# Patient Record
Sex: FEMALE | Race: WHITE | Employment: PART TIME | URBAN - METROPOLITAN AREA
[De-identification: names, ages, dates, MRNs, and addresses within clinical notes are randomized per-mention and may not be internally consistent; named-entity substitution may affect disease eponyms.]

---

## 2017-06-29 ENCOUNTER — OFFICE VISIT (OUTPATIENT)
Dept: INTERNAL MEDICINE CLINIC | Age: 57
End: 2017-06-29
Payer: COMMERCIAL

## 2017-06-29 VITALS
HEIGHT: 62 IN | RESPIRATION RATE: 20 BRPM | SYSTOLIC BLOOD PRESSURE: 90 MMHG | DIASTOLIC BLOOD PRESSURE: 62 MMHG | TEMPERATURE: 98.1 F | HEART RATE: 96 BPM | WEIGHT: 139 LBS | BODY MASS INDEX: 25.58 KG/M2

## 2017-06-29 DIAGNOSIS — Z91.09 ENVIRONMENTAL ALLERGIES: ICD-10-CM

## 2017-06-29 DIAGNOSIS — J01.90 SUBACUTE SINUSITIS, UNSPECIFIED LOCATION: Primary | ICD-10-CM

## 2017-06-29 PROCEDURE — 1036F TOBACCO NON-USER: CPT | Performed by: INTERNAL MEDICINE

## 2017-06-29 PROCEDURE — G8419 CALC BMI OUT NRM PARAM NOF/U: HCPCS | Performed by: INTERNAL MEDICINE

## 2017-06-29 PROCEDURE — 99213 OFFICE O/P EST LOW 20 MIN: CPT | Performed by: INTERNAL MEDICINE

## 2017-06-29 PROCEDURE — 3017F COLORECTAL CA SCREEN DOC REV: CPT | Performed by: INTERNAL MEDICINE

## 2017-06-29 PROCEDURE — 3014F SCREEN MAMMO DOC REV: CPT | Performed by: INTERNAL MEDICINE

## 2017-06-29 PROCEDURE — G8427 DOCREV CUR MEDS BY ELIG CLIN: HCPCS | Performed by: INTERNAL MEDICINE

## 2017-06-29 RX ORDER — AZITHROMYCIN 250 MG/1
TABLET, FILM COATED ORAL
Qty: 1 PACKET | Refills: 0 | Status: SHIPPED | OUTPATIENT
Start: 2017-06-29 | End: 2017-07-10 | Stop reason: CLARIF

## 2017-06-29 RX ORDER — FLUTICASONE PROPIONATE 50 MCG
SPRAY, SUSPENSION (ML) NASAL
Qty: 3 BOTTLE | Refills: 1 | Status: SHIPPED | OUTPATIENT
Start: 2017-06-29 | End: 2018-01-11 | Stop reason: SDUPTHER

## 2017-06-29 ASSESSMENT — PATIENT HEALTH QUESTIONNAIRE - PHQ9
SUM OF ALL RESPONSES TO PHQ QUESTIONS 1-9: 0
1. LITTLE INTEREST OR PLEASURE IN DOING THINGS: 0
2. FEELING DOWN, DEPRESSED OR HOPELESS: 0
SUM OF ALL RESPONSES TO PHQ9 QUESTIONS 1 & 2: 0

## 2017-07-10 RX ORDER — AZITHROMYCIN 250 MG/1
TABLET, FILM COATED ORAL
Qty: 1 PACKET | Refills: 0 | Status: SHIPPED | OUTPATIENT
Start: 2017-07-10 | End: 2017-07-20

## 2017-07-12 ENCOUNTER — TELEPHONE (OUTPATIENT)
Dept: INTERNAL MEDICINE CLINIC | Age: 57
End: 2017-07-12

## 2017-07-12 RX ORDER — CEFUROXIME AXETIL 500 MG/1
500 TABLET ORAL 2 TIMES DAILY
Qty: 20 TABLET | Refills: 0 | Status: SHIPPED | OUTPATIENT
Start: 2017-07-12 | End: 2017-07-22

## 2017-07-20 ENCOUNTER — TELEPHONE (OUTPATIENT)
Dept: INTERNAL MEDICINE CLINIC | Age: 57
End: 2017-07-20

## 2017-07-20 RX ORDER — CEFUROXIME AXETIL 500 MG/1
500 TABLET ORAL 2 TIMES DAILY
Qty: 10 TABLET | Refills: 0 | Status: SHIPPED | OUTPATIENT
Start: 2017-07-20 | End: 2017-07-25

## 2017-07-20 RX ORDER — AMOXICILLIN 500 MG/1
500 TABLET, FILM COATED ORAL 3 TIMES DAILY
Qty: 30 TABLET | Refills: 0 | Status: SHIPPED | OUTPATIENT
Start: 2017-07-20 | End: 2017-07-20 | Stop reason: ALTCHOICE

## 2018-01-11 ENCOUNTER — OFFICE VISIT (OUTPATIENT)
Dept: INTERNAL MEDICINE CLINIC | Age: 58
End: 2018-01-11
Payer: COMMERCIAL

## 2018-01-11 VITALS
OXYGEN SATURATION: 98 % | BODY MASS INDEX: 26.68 KG/M2 | WEIGHT: 145 LBS | SYSTOLIC BLOOD PRESSURE: 102 MMHG | HEIGHT: 62 IN | HEART RATE: 67 BPM | TEMPERATURE: 98.4 F | RESPIRATION RATE: 14 BRPM | DIASTOLIC BLOOD PRESSURE: 64 MMHG

## 2018-01-11 DIAGNOSIS — Z00.00 WELLNESS EXAMINATION: Primary | ICD-10-CM

## 2018-01-11 PROCEDURE — 99396 PREV VISIT EST AGE 40-64: CPT | Performed by: INTERNAL MEDICINE

## 2018-01-11 RX ORDER — FLUTICASONE PROPIONATE 50 MCG
SPRAY, SUSPENSION (ML) NASAL
Qty: 3 BOTTLE | Refills: 1 | Status: SHIPPED | OUTPATIENT
Start: 2018-01-11 | End: 2019-01-31 | Stop reason: SDUPTHER

## 2018-01-11 NOTE — PROGRESS NOTES
Bao Smoker is a 65 y.o. female who presents for   Chief Complaint   Patient presents with    6 Month Follow-Up     Pt has no new concerns today    Other     Pt would like to talk to you about \"Juice Plus\"     and follow up of chronic medical problems. Patient Active Problem List   Diagnosis    MVP (mitral valve prolapse)    Lupus    Irregular menses     HPI  Here for follow-up and for wellness examination and denies any new complaints    Current Outpatient Prescriptions   Medication Sig Dispense Refill    fluticasone (FLONASE) 50 MCG/ACT nasal spray USE 2 SPRAYS IN EACH NOSTRIL DAILY 3 Bottle 1    Loratadine-Pseudoephedrine (CLARITIN-D 12 HOUR PO) Take by mouth daily      desoximetasone (TOPICORT) 0.05 % cream Apply topically 2 times daily. 1 Tube 0    multivitamin (THERAGRAN) per tablet Take 1 tablet by mouth daily.  diphenhydrAMINE (BENADRYL) 25 MG tablet Take 25 mg by mouth nightly.  FISH OIL Take  by mouth daily.  ibuprofen (ADVIL;MOTRIN) 600 MG tablet Take 600 mg by mouth nightly. No current facility-administered medications for this visit.         No Known Allergies    Past Medical History:   Diagnosis Date    Irregular menses     Lupus (HCC)     MVP (mitral valve prolapse)     Seasonal allergies        Past Surgical History:   Procedure Laterality Date    COLONOSCOPY      TUBAL LIGATION         Family History   Problem Relation Age of Onset    High Blood Pressure Father     Heart Disease Other     Cancer Other     Stroke Other     Other Neg Hx      blood clots     ROS   Constitutional:  Negative for fatigue, loss of appetite and unexpected weight change   HEENT            : Negative for neck stiffness and pain, no congestion or sinus pressure   Eyes                : No visual disturbance or pain   Cardiovascular: No chest pain or palpitations or leg swelling   Respiratory      : Negative for cough, shortness of breath or wheezing   Gastrointestinal: Negative No results found for: [address]  Lab Results   Component Value Date    TRIG 56 04/09/2015     No components found for: CHOLHDL  No results found for: WBC, HGB, HCT, MCV, PLT  No results found for: INR, PROTIME  Lab Results   Component Value Date    GLUCOSE 88 04/09/2015    CREATININE 0.69 04/09/2015    BUN 8 04/09/2015     04/09/2015    K 4.2 04/09/2015     04/09/2015    CO2 27 04/09/2015     Lab Results   Component Value Date    ALT 18 04/09/2015    AST 21 04/09/2015    ALKPHOS 80 04/09/2015    BILITOT 0.68 04/09/2015     Lab Results   Component Value Date    LABPROT 7.1 03/21/2013    LABALBU 3.8 04/09/2015     No results found for: TSH, CBC  Assessment:  1. Wellness examination        Plan:  Labs ordered to check for cholesterol and previous labs reviewed  Discussed about immunizations and information given to the patient  Discussed about juice plus and patient plans to take it  Activity as tolerated  Review in 6 months           1. Yaneli Ledezma received counseling on the following healthy behaviors: nutrition and exercise    2. Prior labs and health maintenance reviewed. 3.  Discussed use, benefit, and side effects of prescribed medications. Barriers to medication compliance addressed. All her questions were answered. Pt voiced understanding. Yaneli Ledezma will continue current medications, diet and exercise. Orders Placed This Encounter   Medications    fluticasone (FLONASE) 50 MCG/ACT nasal spray     Sig: USE 2 SPRAYS IN EACH NOSTRIL DAILY     Dispense:  3 Bottle     Refill:  1          Completed Refills               Requested Prescriptions     Signed Prescriptions Disp Refills    fluticasone (FLONASE) 50 MCG/ACT nasal spray 3 Bottle 1     Sig: USE 2 SPRAYS IN EACH NOSTRIL DAILY     4. Patient given educational materials - see patient instructions    5. Was a self-tracking handout given in paper form or via [employer]?   NO    Orders Placed This Encounter   Procedures   

## 2018-02-09 ENCOUNTER — HOSPITAL ENCOUNTER (OUTPATIENT)
Age: 58
Discharge: HOME | End: 2018-02-09
Payer: COMMERCIAL

## 2018-02-09 DIAGNOSIS — Z00.00 WELLNESS EXAMINATION: ICD-10-CM

## 2018-02-09 LAB
ALBUMIN SERPL-MCNC: 4.5 G/DL (ref 3.5–5.2)
ALBUMIN/GLOBULIN RATIO: 1.7 (ref 1–2.5)
ALP BLD-CCNC: 88 U/L (ref 35–104)
ALT SERPL-CCNC: 17 U/L (ref 5–33)
ANION GAP SERPL CALCULATED.3IONS-SCNC: 15 MMOL/L (ref 9–17)
AST SERPL-CCNC: 23 U/L
BILIRUB SERPL-MCNC: 0.46 MG/DL (ref 0.3–1.2)
BUN BLDV-MCNC: 16 MG/DL (ref 6–20)
BUN/CREAT BLD: ABNORMAL (ref 9–20)
CALCIUM SERPL-MCNC: 9.2 MG/DL (ref 8.6–10.4)
CHLORIDE BLD-SCNC: 104 MMOL/L (ref 98–107)
CHOLESTEROL/HDL RATIO: 2.4
CHOLESTEROL: 205 MG/DL
CO2: 26 MMOL/L (ref 20–31)
CREAT SERPL-MCNC: 0.83 MG/DL (ref 0.5–0.9)
GFR AFRICAN AMERICAN: >60 ML/MIN
GFR NON-AFRICAN AMERICAN: >60 ML/MIN
GFR SERPL CREATININE-BSD FRML MDRD: ABNORMAL ML/MIN/{1.73_M2}
GFR SERPL CREATININE-BSD FRML MDRD: ABNORMAL ML/MIN/{1.73_M2}
GLUCOSE BLD-MCNC: 88 MG/DL (ref 70–99)
HCT VFR BLD CALC: 45.5 % (ref 36.3–47.1)
HDLC SERPL-MCNC: 84 MG/DL
HEMOGLOBIN: 15.1 G/DL (ref 11.9–15.1)
LDL CHOLESTEROL: 113 MG/DL (ref 0–130)
MCH RBC QN AUTO: 30.7 PG (ref 25.2–33.5)
MCHC RBC AUTO-ENTMCNC: 33.2 G/DL (ref 28.4–34.8)
MCV RBC AUTO: 92.5 FL (ref 82.6–102.9)
NRBC AUTOMATED: 0 PER 100 WBC
PDW BLD-RTO: 11.9 % (ref 11.8–14.4)
PLATELET # BLD: 258 K/UL (ref 138–453)
PMV BLD AUTO: 11.3 FL (ref 8.1–13.5)
POTASSIUM SERPL-SCNC: 4.5 MMOL/L (ref 3.7–5.3)
RBC # BLD: 4.92 M/UL (ref 3.95–5.11)
SODIUM BLD-SCNC: 145 MMOL/L (ref 135–144)
TOTAL PROTEIN: 7.1 G/DL (ref 6.4–8.3)
TRIGL SERPL-MCNC: 38 MG/DL
TSH SERPL DL<=0.05 MIU/L-ACNC: 2.99 MIU/L (ref 0.3–5)
VITAMIN D 25-HYDROXY: 34 NG/ML (ref 30–100)
VLDLC SERPL CALC-MCNC: ABNORMAL MG/DL (ref 1–30)
WBC # BLD: 2.9 K/UL (ref 3.5–11.3)

## 2018-02-09 PROCEDURE — 82306 VITAMIN D 25 HYDROXY: CPT

## 2018-02-09 PROCEDURE — 36415 COLL VENOUS BLD VENIPUNCTURE: CPT

## 2018-02-09 PROCEDURE — 85027 COMPLETE CBC AUTOMATED: CPT

## 2018-02-09 PROCEDURE — 80061 LIPID PANEL: CPT

## 2018-02-09 PROCEDURE — 80053 COMPREHEN METABOLIC PANEL: CPT

## 2018-02-09 PROCEDURE — 84443 ASSAY THYROID STIM HORMONE: CPT

## 2018-07-23 ENCOUNTER — OFFICE VISIT (OUTPATIENT)
Dept: INTERNAL MEDICINE CLINIC | Age: 58
End: 2018-07-23
Payer: COMMERCIAL

## 2018-07-23 VITALS
TEMPERATURE: 98 F | HEIGHT: 62 IN | BODY MASS INDEX: 26.13 KG/M2 | HEART RATE: 74 BPM | DIASTOLIC BLOOD PRESSURE: 70 MMHG | SYSTOLIC BLOOD PRESSURE: 102 MMHG | WEIGHT: 142 LBS | OXYGEN SATURATION: 94 %

## 2018-07-23 DIAGNOSIS — L30.9 ECZEMA, UNSPECIFIED TYPE: ICD-10-CM

## 2018-07-23 DIAGNOSIS — Z91.09 ENVIRONMENTAL ALLERGIES: Primary | ICD-10-CM

## 2018-07-23 PROCEDURE — 1036F TOBACCO NON-USER: CPT | Performed by: INTERNAL MEDICINE

## 2018-07-23 PROCEDURE — 3017F COLORECTAL CA SCREEN DOC REV: CPT | Performed by: INTERNAL MEDICINE

## 2018-07-23 PROCEDURE — G8419 CALC BMI OUT NRM PARAM NOF/U: HCPCS | Performed by: INTERNAL MEDICINE

## 2018-07-23 PROCEDURE — G8427 DOCREV CUR MEDS BY ELIG CLIN: HCPCS | Performed by: INTERNAL MEDICINE

## 2018-07-23 PROCEDURE — 99213 OFFICE O/P EST LOW 20 MIN: CPT | Performed by: INTERNAL MEDICINE

## 2018-07-23 RX ORDER — DESOXIMETASONE 0.5 MG/G
CREAM TOPICAL
Qty: 1 TUBE | Refills: 0 | Status: SHIPPED | OUTPATIENT
Start: 2018-07-23 | End: 2019-01-31 | Stop reason: CLARIF

## 2018-07-23 RX ORDER — ASPIRIN 81 MG/1
81 TABLET ORAL EVERY OTHER DAY
COMMUNITY
End: 2019-07-29 | Stop reason: ALTCHOICE

## 2018-07-23 ASSESSMENT — PATIENT HEALTH QUESTIONNAIRE - PHQ9
2. FEELING DOWN, DEPRESSED OR HOPELESS: 0
1. LITTLE INTEREST OR PLEASURE IN DOING THINGS: 0
SUM OF ALL RESPONSES TO PHQ QUESTIONS 1-9: 0
SUM OF ALL RESPONSES TO PHQ9 QUESTIONS 1 & 2: 0

## 2018-07-23 NOTE — PROGRESS NOTES
Juana Cruz is a 65 y.o. female who presents for   Chief Complaint   Patient presents with    6 Month Follow-Up    and follow up of chronic medical problems. Patient Active Problem List   Diagnosis    MVP (mitral valve prolapse)    Lupus    Irregular menses     HPI  Here for follow-up on allergies denies any new complaints other than some kind of a stress with her 80-year-old father in the hospital    Current Outpatient Prescriptions   Medication Sig Dispense Refill    aspirin 81 MG EC tablet Take 81 mg by mouth daily      NONFORMULARY Take by mouth daily      desoximetasone (TOPICORT) 0.05 % cream Apply topically 2 times daily. 1 Tube 0    fluticasone (FLONASE) 50 MCG/ACT nasal spray USE 2 SPRAYS IN EACH NOSTRIL DAILY 3 Bottle 1    Loratadine-Pseudoephedrine (CLARITIN-D 12 HOUR PO) Take by mouth daily      multivitamin (THERAGRAN) per tablet Take 1 tablet by mouth daily.  ibuprofen (ADVIL;MOTRIN) 600 MG tablet Take 600 mg by mouth nightly.  FISH OIL Take  by mouth daily.  diphenhydrAMINE (BENADRYL) 25 MG tablet Take 25 mg by mouth nightly. No current facility-administered medications for this visit.         No Known Allergies    Past Medical History:   Diagnosis Date    Irregular menses     Lupus     MVP (mitral valve prolapse)     Seasonal allergies        Past Surgical History:   Procedure Laterality Date    COLONOSCOPY      TUBAL LIGATION         Family History   Problem Relation Age of Onset    High Blood Pressure Father     Heart Disease Other     Cancer Other     Stroke Other     Other Neg Hx         blood clots     ROS   Constitutional:  Negative for fatigue, loss of appetite and unexpected weight change   HEENT            : Negative for neck stiffness and pain, no congestion or sinus pressure   Eyes                : No visual disturbance or pain   Cardiovascular: No chest pain or palpitations or leg swelling   Respiratory      : Negative for cough,

## 2018-07-23 NOTE — PROGRESS NOTES
Visit Information    Have you changed or started any medications since your last visit including any over-the-counter medicines, vitamins, or herbal medicines? no   Have you stopped taking any of your medications? Is so, why? -  no  Are you having any side effects from any of your medications? - no    Have you seen any other physician or provider since your last visit? Yes GYN   Have you had any other diagnostic tests since your last visit?  no   Have you been seen in the emergency room and/or had an admission in a hospital since we last saw you?  no   Have you had your routine dental cleaning in the past 6 months?  yes      Do you have an active MyChart account? If no, what is the barrier?   Yes    Patient Care Team:  Norma Owens MD as PCP - General    Medical History Review  Past Medical, Family, and Social History reviewed and does contribute to the patient presenting condition    Health Maintenance   Topic Date Due    Hepatitis C screen  1960    HIV screen  08/11/1976    Shingles Vaccine (1 of 2 - 2 Dose Series) 08/11/2011    DTaP/Tdap/Td vaccine (1 - Tdap) 01/30/2019 (Originally 8/11/1980)    Flu vaccine (1) 09/01/2018    Cervical cancer screen  10/15/2018    Breast cancer screen  02/02/2020    Lipid screen  02/09/2023    Colon cancer screen colonoscopy  03/11/2026

## 2018-08-03 ENCOUNTER — TELEPHONE (OUTPATIENT)
Dept: INTERNAL MEDICINE CLINIC | Age: 58
End: 2018-08-03

## 2018-08-03 RX ORDER — TRIAMCINOLONE ACETONIDE 5 MG/G
OINTMENT TOPICAL
Qty: 1 TUBE | Refills: 1 | Status: SHIPPED | OUTPATIENT
Start: 2018-08-03 | End: 2018-08-10

## 2018-08-06 ENCOUNTER — HOSPITAL ENCOUNTER (OUTPATIENT)
Age: 58
Setting detail: SPECIMEN
Discharge: HOME | End: 2018-08-06
Payer: COMMERCIAL

## 2018-08-06 DIAGNOSIS — L30.9 ECZEMA, UNSPECIFIED TYPE: ICD-10-CM

## 2018-08-06 DIAGNOSIS — Z91.09 ENVIRONMENTAL ALLERGIES: ICD-10-CM

## 2018-08-06 LAB
ALBUMIN SERPL-MCNC: 3.8 G/DL (ref 3.5–5.2)
ALBUMIN/GLOBULIN RATIO: 1.5 (ref 1–2.5)
ALP BLD-CCNC: 71 U/L (ref 35–104)
ALT SERPL-CCNC: 17 U/L (ref 5–33)
ANION GAP SERPL CALCULATED.3IONS-SCNC: 9 MMOL/L (ref 9–17)
AST SERPL-CCNC: 27 U/L
BILIRUB SERPL-MCNC: 0.28 MG/DL (ref 0.3–1.2)
BUN BLDV-MCNC: 13 MG/DL (ref 6–20)
BUN/CREAT BLD: ABNORMAL (ref 9–20)
CALCIUM SERPL-MCNC: 8.4 MG/DL (ref 8.6–10.4)
CHLORIDE BLD-SCNC: 106 MMOL/L (ref 98–107)
CHOLESTEROL/HDL RATIO: 2.3
CHOLESTEROL: 172 MG/DL
CO2: 26 MMOL/L (ref 20–31)
CREAT SERPL-MCNC: 0.76 MG/DL (ref 0.5–0.9)
ESTIMATED AVERAGE GLUCOSE: 105 MG/DL
GFR AFRICAN AMERICAN: >60 ML/MIN
GFR NON-AFRICAN AMERICAN: >60 ML/MIN
GFR SERPL CREATININE-BSD FRML MDRD: ABNORMAL ML/MIN/{1.73_M2}
GFR SERPL CREATININE-BSD FRML MDRD: ABNORMAL ML/MIN/{1.73_M2}
GLUCOSE BLD-MCNC: 93 MG/DL (ref 70–99)
HBA1C MFR BLD: 5.3 % (ref 4–6)
HCT VFR BLD CALC: 41.5 % (ref 36.3–47.1)
HDLC SERPL-MCNC: 75 MG/DL
HEMOGLOBIN: 13.2 G/DL (ref 11.9–15.1)
LDL CHOLESTEROL: 88 MG/DL (ref 0–130)
MAGNESIUM: 2 MG/DL (ref 1.6–2.6)
MCH RBC QN AUTO: 29.9 PG (ref 25.2–33.5)
MCHC RBC AUTO-ENTMCNC: 31.8 G/DL (ref 28.4–34.8)
MCV RBC AUTO: 93.9 FL (ref 82.6–102.9)
NRBC AUTOMATED: 0 PER 100 WBC
PDW BLD-RTO: 12.3 % (ref 11.8–14.4)
PLATELET # BLD: 242 K/UL (ref 138–453)
PMV BLD AUTO: 11.1 FL (ref 8.1–13.5)
POTASSIUM SERPL-SCNC: 4 MMOL/L (ref 3.7–5.3)
RBC # BLD: 4.42 M/UL (ref 3.95–5.11)
SODIUM BLD-SCNC: 141 MMOL/L (ref 135–144)
TOTAL PROTEIN: 6.3 G/DL (ref 6.4–8.3)
TRIGL SERPL-MCNC: 47 MG/DL
TSH SERPL DL<=0.05 MIU/L-ACNC: 2.35 MIU/L (ref 0.3–5)
VITAMIN B-12: 417 PG/ML (ref 232–1245)
VITAMIN D 25-HYDROXY: 38.3 NG/ML (ref 30–100)
VLDLC SERPL CALC-MCNC: NORMAL MG/DL (ref 1–30)
WBC # BLD: 3.5 K/UL (ref 3.5–11.3)

## 2018-09-27 ENCOUNTER — TELEPHONE (OUTPATIENT)
Dept: INTERNAL MEDICINE CLINIC | Age: 58
End: 2018-09-27

## 2018-09-27 ENCOUNTER — HOSPITAL ENCOUNTER (OUTPATIENT)
Age: 58
Setting detail: SPECIMEN
Discharge: HOME | End: 2018-09-27
Payer: COMMERCIAL

## 2018-09-27 DIAGNOSIS — N39.0 URINARY TRACT INFECTION WITHOUT HEMATURIA, SITE UNSPECIFIED: Primary | ICD-10-CM

## 2018-09-27 LAB
-: NORMAL
AMORPHOUS: NORMAL
BACTERIA: NORMAL
BILIRUBIN URINE: NEGATIVE
CASTS UA: NORMAL /LPF (ref 0–8)
COLOR: YELLOW
COMMENT UA: ABNORMAL
CRYSTALS, UA: NORMAL /HPF
EPITHELIAL CELLS UA: NORMAL /HPF (ref 0–5)
GLUCOSE URINE: NEGATIVE
KETONES, URINE: NEGATIVE
LEUKOCYTE ESTERASE, URINE: ABNORMAL
MUCUS: NORMAL
NITRITE, URINE: NEGATIVE
OTHER OBSERVATIONS UA: NORMAL
PH UA: 6 (ref 5–8)
PROTEIN UA: NEGATIVE
RBC UA: NORMAL /HPF (ref 0–4)
RENAL EPITHELIAL, UA: NORMAL /HPF
SPECIFIC GRAVITY UA: 1 (ref 1–1.03)
TRICHOMONAS: NORMAL
TURBIDITY: CLEAR
URINE HGB: NEGATIVE
UROBILINOGEN, URINE: NORMAL
WBC UA: NORMAL /HPF (ref 0–5)
YEAST: NORMAL

## 2018-09-28 LAB
CULTURE: NORMAL
Lab: NORMAL
SPECIMEN DESCRIPTION: NORMAL
STATUS: NORMAL

## 2019-01-31 ENCOUNTER — OFFICE VISIT (OUTPATIENT)
Dept: INTERNAL MEDICINE CLINIC | Age: 59
End: 2019-01-31
Payer: COMMERCIAL

## 2019-01-31 VITALS
BODY MASS INDEX: 26.65 KG/M2 | HEART RATE: 88 BPM | RESPIRATION RATE: 16 BRPM | TEMPERATURE: 98 F | DIASTOLIC BLOOD PRESSURE: 64 MMHG | WEIGHT: 144.8 LBS | SYSTOLIC BLOOD PRESSURE: 98 MMHG | HEIGHT: 62 IN

## 2019-01-31 DIAGNOSIS — Z91.09 ENVIRONMENTAL ALLERGIES: Primary | ICD-10-CM

## 2019-01-31 PROCEDURE — G8484 FLU IMMUNIZE NO ADMIN: HCPCS | Performed by: INTERNAL MEDICINE

## 2019-01-31 PROCEDURE — G8427 DOCREV CUR MEDS BY ELIG CLIN: HCPCS | Performed by: INTERNAL MEDICINE

## 2019-01-31 PROCEDURE — 99213 OFFICE O/P EST LOW 20 MIN: CPT | Performed by: INTERNAL MEDICINE

## 2019-01-31 PROCEDURE — 1036F TOBACCO NON-USER: CPT | Performed by: INTERNAL MEDICINE

## 2019-01-31 PROCEDURE — 3017F COLORECTAL CA SCREEN DOC REV: CPT | Performed by: INTERNAL MEDICINE

## 2019-01-31 PROCEDURE — G8419 CALC BMI OUT NRM PARAM NOF/U: HCPCS | Performed by: INTERNAL MEDICINE

## 2019-01-31 RX ORDER — FLUTICASONE PROPIONATE 50 MCG
SPRAY, SUSPENSION (ML) NASAL
Qty: 3 BOTTLE | Refills: 1 | Status: SHIPPED | OUTPATIENT
Start: 2019-01-31 | End: 2019-07-08 | Stop reason: SDUPTHER

## 2019-01-31 ASSESSMENT — PATIENT HEALTH QUESTIONNAIRE - PHQ9
SUM OF ALL RESPONSES TO PHQ9 QUESTIONS 1 & 2: 0
2. FEELING DOWN, DEPRESSED OR HOPELESS: 0
SUM OF ALL RESPONSES TO PHQ QUESTIONS 1-9: 0
SUM OF ALL RESPONSES TO PHQ QUESTIONS 1-9: 0
1. LITTLE INTEREST OR PLEASURE IN DOING THINGS: 0

## 2019-07-08 RX ORDER — FLUTICASONE PROPIONATE 50 MCG
SPRAY, SUSPENSION (ML) NASAL
Qty: 48 G | Refills: 1 | Status: SHIPPED | OUTPATIENT
Start: 2019-07-08 | End: 2020-01-30 | Stop reason: SDUPTHER

## 2019-07-29 ENCOUNTER — OFFICE VISIT (OUTPATIENT)
Dept: INTERNAL MEDICINE CLINIC | Age: 59
End: 2019-07-29
Payer: COMMERCIAL

## 2019-07-29 VITALS
HEIGHT: 62 IN | SYSTOLIC BLOOD PRESSURE: 102 MMHG | DIASTOLIC BLOOD PRESSURE: 64 MMHG | WEIGHT: 146 LBS | HEART RATE: 72 BPM | BODY MASS INDEX: 26.87 KG/M2 | TEMPERATURE: 97.6 F | RESPIRATION RATE: 16 BRPM

## 2019-07-29 DIAGNOSIS — M79.89 SWELLING OF RIGHT MIDDLE FINGER: ICD-10-CM

## 2019-07-29 DIAGNOSIS — Z23 NEED FOR VACCINATION WITH 13-POLYVALENT PNEUMOCOCCAL CONJUGATE VACCINE: ICD-10-CM

## 2019-07-29 DIAGNOSIS — Z91.09 ENVIRONMENTAL ALLERGIES: Primary | ICD-10-CM

## 2019-07-29 PROCEDURE — 99213 OFFICE O/P EST LOW 20 MIN: CPT | Performed by: INTERNAL MEDICINE

## 2019-07-29 PROCEDURE — G8419 CALC BMI OUT NRM PARAM NOF/U: HCPCS | Performed by: INTERNAL MEDICINE

## 2019-07-29 PROCEDURE — 90670 PCV13 VACCINE IM: CPT | Performed by: INTERNAL MEDICINE

## 2019-07-29 PROCEDURE — G8427 DOCREV CUR MEDS BY ELIG CLIN: HCPCS | Performed by: INTERNAL MEDICINE

## 2019-07-29 PROCEDURE — 1036F TOBACCO NON-USER: CPT | Performed by: INTERNAL MEDICINE

## 2019-07-29 PROCEDURE — 3017F COLORECTAL CA SCREEN DOC REV: CPT | Performed by: INTERNAL MEDICINE

## 2019-07-29 PROCEDURE — 90471 IMMUNIZATION ADMIN: CPT | Performed by: INTERNAL MEDICINE

## 2019-07-29 RX ORDER — CYANOCOBALAMIN (VITAMIN B-12) 1000 MCG
2 TABLET, EXTENDED RELEASE ORAL 2 TIMES DAILY WITH MEALS
COMMUNITY

## 2019-07-29 ASSESSMENT — PATIENT HEALTH QUESTIONNAIRE - PHQ9
1. LITTLE INTEREST OR PLEASURE IN DOING THINGS: 0
SUM OF ALL RESPONSES TO PHQ QUESTIONS 1-9: 0
SUM OF ALL RESPONSES TO PHQ9 QUESTIONS 1 & 2: 0
2. FEELING DOWN, DEPRESSED OR HOPELESS: 0
SUM OF ALL RESPONSES TO PHQ QUESTIONS 1-9: 0

## 2019-07-29 NOTE — PROGRESS NOTES
No components found for: [place], [place]  Lab Results   Component Value Date    WBC 3.5 08/06/2018    HGB 13.2 08/06/2018    HCT 41.5 08/06/2018    MCV 93.9 08/06/2018     08/06/2018     No results found for: INR, PROTIME  Lab Results   Component Value Date    GLUCOSE 93 08/06/2018    CREATININE 0.76 08/06/2018    BUN 13 08/06/2018     08/06/2018    K 4.0 08/06/2018     08/06/2018    CO2 26 08/06/2018     Lab Results   Component Value Date    ALT 17 08/06/2018    AST 27 08/06/2018    ALKPHOS 71 08/06/2018    BILITOT 0.28 (L) 08/06/2018     Lab Results   Component Value Date    LABPROT 7.1 03/21/2013    LABALBU 3.8 08/06/2018     Lab Results   Component Value Date    TSH 2.35 08/06/2018     Assessment:  1. Environmental allergies    2. Swelling of right middle finger        Plan:  Patient's allergies is stable and continue Flonase as before  Patient is getting occasional swelling and pain in the right ring finger and also the left ring finger and patient had a history of positive MEERA in the past and has seen the rheumatologist and was cleared that patient had no lupus but patient's mother had a history of for questionable lupus nephritis and had kidney failure and had a kidney transplant and patient's mother passed away at the age of 55 and recently her father passed away at the age of 80 of natural causes  Labs ordered to check for cholesterol and thyroid  Last year patient's vitamin D levels were within normal limits and so not repeated at this time  Pneumonia vaccines discussed and Prevnar 13 given to the patient  Review in 6 months           1. Jermaine Gil received counseling on the following healthy behaviors: nutrition and exercise    2. Prior labs and health maintenance reviewed. 3.  Discussed use, benefit, and side effects of prescribed medications. Barriers to medication compliance addressed. All her questions were answered. Pt voiced understanding.    Jermaine Gil will continue current

## 2019-08-30 LAB
ABSOLUTE BASO #: 0 X10E9/L (ref 0–0.9)
ABSOLUTE EOS #: 0 X10E9/L (ref 0–0.4)
ABSOLUTE LYMPH #: 0.9 X10E9/L (ref 1–3.5)
ABSOLUTE MONO #: 0.3 X10E9/L (ref 0–0.9)
ABSOLUTE NEUT #: 1.8 X10E9/L (ref 1.5–6.6)
BASOPHILS RELATIVE PERCENT: 1.5 %
CHOLESTEROL/HDL RATIO: 2.8 (ref 1–5)
CHOLESTEROL: 202 MG/DL (ref 150–200)
EOSINOPHILS RELATIVE PERCENT: 1.4 %
HCT VFR BLD CALC: 41.6 % (ref 35–47)
HDLC SERPL-MCNC: 73 MG/DL
HEMOGLOBIN: 14.4 G/DL (ref 11.7–16)
LDL CHOLESTEROL CALCULATED: 118 MG/DL
LDL/HDL RATIO: 1.6
LYMPHOCYTE %: 28.9 %
MCH RBC QN AUTO: 31.8 PG (ref 26–33.5)
MCHC RBC AUTO-ENTMCNC: 34.6 G/DL (ref 32–36)
MCV RBC AUTO: 92 FL (ref 81–100)
MONOCYTES # BLD: 8.8 %
NEUTROPHILS RELATIVE PERCENT: 59.4 %
PDW BLD-RTO: 12.7 % (ref 11.5–14.7)
PLATELETS: 221 X10E9/L (ref 150–450)
PMV BLD AUTO: 9.3 FL (ref 7–12)
RBC: 4.52 X10E12/L (ref 3.8–5.2)
RHEUMATOID FACTOR: <10 IU/ML
SEDIMENTATION RATE, ERYTHROCYTE: 6 MM/H (ref 0–30)
TRIGL SERPL-MCNC: 55 MG/DL (ref 27–150)
TSH SERPL DL<=0.05 MIU/L-ACNC: 2.24 UIU/ML (ref 0.49–4.67)
URIC ACID: 4.8 MG/DL (ref 2.6–7.2)
VLDLC SERPL CALC-MCNC: 11 MG/DL (ref 0–30)
WBC: 3 X10E9/L (ref 4.8–10.8)

## 2019-09-04 ENCOUNTER — TELEPHONE (OUTPATIENT)
Dept: INTERNAL MEDICINE CLINIC | Age: 59
End: 2019-09-04

## 2019-09-04 DIAGNOSIS — R76.8 POSITIVE ANA (ANTINUCLEAR ANTIBODY): Primary | ICD-10-CM

## 2019-09-06 LAB
ANA PATTERN: ABNORMAL
ANA TITER: ABNORMAL TITER
ANTI-NUCLEAR ANTIBODY (ANA): POSITIVE
CHROMATIN ANTIBODY: <0.2 AI
DSDNA ANTIBODY: 16 IU/ML
ENA TO SMITH (SM) ANTIBODY IGG: <0.2 AI
RNP AB, IGG: <0.2 AI
SCLERODERMA ANTIBODY, IGG: <0.2 AI
SJOGREN'S ANTIBODIES (SSA): <0.2 AI
SJOGREN'S ANTIBODIES (SSB): <0.2 AI

## 2019-10-25 ENCOUNTER — TELEPHONE (OUTPATIENT)
Dept: INTERNAL MEDICINE CLINIC | Age: 59
End: 2019-10-25

## 2019-11-04 ENCOUNTER — TELEPHONE (OUTPATIENT)
Dept: INTERNAL MEDICINE CLINIC | Age: 59
End: 2019-11-04

## 2019-11-04 RX ORDER — LEVOFLOXACIN 500 MG/1
500 TABLET, FILM COATED ORAL DAILY
Qty: 10 TABLET | Refills: 0 | Status: SHIPPED | OUTPATIENT
Start: 2019-11-04 | End: 2019-11-14

## 2019-11-21 ENCOUNTER — TELEPHONE (OUTPATIENT)
Dept: INTERNAL MEDICINE CLINIC | Age: 59
End: 2019-11-21

## 2019-12-17 ENCOUNTER — TELEPHONE (OUTPATIENT)
Dept: INTERNAL MEDICINE CLINIC | Age: 59
End: 2019-12-17

## 2019-12-17 RX ORDER — PREDNISONE 10 MG/1
10 TABLET ORAL
Qty: 15 TABLET | Refills: 0 | Status: SHIPPED | OUTPATIENT
Start: 2019-12-17 | End: 2019-12-22

## 2019-12-17 RX ORDER — PREDNISONE 10 MG/1
10 TABLET ORAL
Qty: 15 TABLET | Refills: 0 | Status: CANCELLED | OUTPATIENT
Start: 2019-12-17 | End: 2019-12-22

## 2020-01-20 ENCOUNTER — OFFICE VISIT (OUTPATIENT)
Dept: INTERNAL MEDICINE CLINIC | Age: 60
End: 2020-01-20
Payer: COMMERCIAL

## 2020-01-20 VITALS
TEMPERATURE: 97.1 F | SYSTOLIC BLOOD PRESSURE: 102 MMHG | HEIGHT: 62 IN | HEART RATE: 74 BPM | OXYGEN SATURATION: 90 % | WEIGHT: 147.8 LBS | DIASTOLIC BLOOD PRESSURE: 68 MMHG | BODY MASS INDEX: 27.2 KG/M2

## 2020-01-20 PROCEDURE — 99213 OFFICE O/P EST LOW 20 MIN: CPT | Performed by: INTERNAL MEDICINE

## 2020-01-20 RX ORDER — AZELASTINE 1 MG/ML
1 SPRAY, METERED NASAL 2 TIMES DAILY
Qty: 2 BOTTLE | Refills: 0 | Status: SHIPPED | OUTPATIENT
Start: 2020-01-20 | End: 2021-07-29

## 2020-01-20 ASSESSMENT — PATIENT HEALTH QUESTIONNAIRE - PHQ9
SUM OF ALL RESPONSES TO PHQ9 QUESTIONS 1 & 2: 0
1. LITTLE INTEREST OR PLEASURE IN DOING THINGS: 0
2. FEELING DOWN, DEPRESSED OR HOPELESS: 0
SUM OF ALL RESPONSES TO PHQ QUESTIONS 1-9: 0
SUM OF ALL RESPONSES TO PHQ QUESTIONS 1-9: 0

## 2020-01-20 NOTE — PROGRESS NOTES
Value Date    CHOL 202 (H) 08/29/2019     Lab Results   Component Value Date    HDL 73 08/29/2019     Lab Results   Component Value Date    LDLCALC 118 08/29/2019     Lab Results   Component Value Date    TRIG 55 08/29/2019     No components found for: [place], [place]  Lab Results   Component Value Date    WBC 3.0 (L) 08/29/2019    HGB 14.4 08/29/2019    HCT 41.6 08/29/2019    MCV 92 08/29/2019     08/29/2019     No results found for: INR, PROTIME  Lab Results   Component Value Date    GLUCOSE 93 08/06/2018    CREATININE 0.76 08/06/2018    BUN 13 08/06/2018     08/06/2018    K 4.0 08/06/2018     08/06/2018    CO2 26 08/06/2018     Lab Results   Component Value Date    ALT 17 08/06/2018    AST 27 08/06/2018    ALKPHOS 71 08/06/2018    BILITOT 0.28 (L) 08/06/2018     Lab Results   Component Value Date    LABPROT 7.1 03/21/2013    LABALBU 3.8 08/06/2018     Lab Results   Component Value Date    TSH 2.24 08/29/2019     Assessment:  1. Environmental allergies    2. Elevated antinuclear antibody (MEERA) level        Plan:  Patient's allergies are better but still having postnasal drip and patient is using Flonase and Claritin and I did advise her to use the Astelin nose spray and a prescription was given and continue both Flonase and Astelin and Claritin and call me back in 2 weeks  Patient has MEERA positive and dsDNA elevated to 16 normal being less than 5 and patient symptoms have improved with her swelling of the fingers but I did advise her to repeat the test to evaluate and also discussed about seeing the rheumatologist if needed  Review in 6 months           1. Delia Kilgore received counseling on the following healthy behaviors: nutrition and exercise    2. Prior labs and health maintenance reviewed. 3.  Discussed use, benefit, and side effects of prescribed medications. Barriers to medication compliance addressed. All her questions were answered. Pt voiced understanding.    Delia Kilgore will continue current medications, diet and exercise. Orders Placed This Encounter   Medications    azelastine (ASTELIN) 0.1 % nasal spray     Si spray by Nasal route 2 times daily Use in each nostril as directed     Dispense:  2 Bottle     Refill:  0          Completed Refills               Requested Prescriptions     Signed Prescriptions Disp Refills    azelastine (ASTELIN) 0.1 % nasal spray 2 Bottle 0     Si spray by Nasal route 2 times daily Use in each nostril as directed     4. Patient given educational materials - see patient instructions    5. Was a self-tracking handout given in paper form or via [employer]? NO    Orders Placed This Encounter   Procedures    MEERA Screen With Reflex     Standing Status:   Future     Standing Expiration Date:   2021     Return in about 6 months (around 2020). Patient voiced understanding and agreed to treatment plan. Electronically signed by Marcell Alvarenga MD on 2020 at 2:45 PM    This note is created with a voice recognition program and while intend to generate a document that accurately reflects the content of the visit, no guarantee can be provided that every mistake has been identified and corrected by editing.

## 2020-01-30 RX ORDER — FLUTICASONE PROPIONATE 50 MCG
SPRAY, SUSPENSION (ML) NASAL
Qty: 48 G | Refills: 1 | Status: SHIPPED | OUTPATIENT
Start: 2020-01-30 | End: 2020-10-06

## 2020-07-23 ENCOUNTER — OFFICE VISIT (OUTPATIENT)
Dept: INTERNAL MEDICINE CLINIC | Age: 60
End: 2020-07-23
Payer: COMMERCIAL

## 2020-07-23 VITALS
HEIGHT: 62 IN | OXYGEN SATURATION: 98 % | DIASTOLIC BLOOD PRESSURE: 64 MMHG | TEMPERATURE: 96.3 F | SYSTOLIC BLOOD PRESSURE: 112 MMHG | HEART RATE: 78 BPM | BODY MASS INDEX: 26.5 KG/M2 | RESPIRATION RATE: 15 BRPM | WEIGHT: 144 LBS

## 2020-07-23 PROCEDURE — 90732 PPSV23 VACC 2 YRS+ SUBQ/IM: CPT | Performed by: INTERNAL MEDICINE

## 2020-07-23 PROCEDURE — 99214 OFFICE O/P EST MOD 30 MIN: CPT | Performed by: INTERNAL MEDICINE

## 2020-07-23 PROCEDURE — 90471 IMMUNIZATION ADMIN: CPT | Performed by: INTERNAL MEDICINE

## 2020-07-23 NOTE — PROGRESS NOTES
Nicole Padgett is a 63 y.o. female who presents for   Chief Complaint   Patient presents with    Allergies    and follow up of chronic medical problems. Patient Active Problem List   Diagnosis    MVP (mitral valve prolapse)    Lupus (HCC)    Irregular menses     HPI  Here for follow-up on allergies denies any new complaints    Current Outpatient Medications   Medication Sig Dispense Refill    fluticasone (FLONASE) 50 MCG/ACT nasal spray 2 spays daily 48 g 1    azelastine (ASTELIN) 0.1 % nasal spray 1 spray by Nasal route 2 times daily Use in each nostril as directed 2 Bottle 0    calcium citrate-vitamin D (CITRICAL + D) 315-250 MG-UNIT TABS per tablet Take 2 tablets by mouth 2 times daily (with meals)      Probiotic Product (PROBIOTIC-10 PO) Take 1 tablet by mouth daily      NONFORMULARY Take by mouth daily      Loratadine-Pseudoephedrine (CLARITIN-D 12 HOUR PO) Take by mouth daily      FISH OIL Take  by mouth daily. No current facility-administered medications for this visit.         No Known Allergies    Past Medical History:   Diagnosis Date    Irregular menses     Lupus (HCC)     MVP (mitral valve prolapse)     Seasonal allergies        Past Surgical History:   Procedure Laterality Date    COLONOSCOPY      TUBAL LIGATION         Family History   Problem Relation Age of Onset    High Blood Pressure Father     Heart Disease Other     Cancer Other     Stroke Other     Other Neg Hx         blood clots     ROS   Constitutional:  Negative for fatigue, loss of appetite and unexpected weight change   HEENT            : Negative for neck stiffness and pain, no congestion or sinus pressure   Eyes                : No visual disturbance or pain   Cardiovascular: No chest pain or palpitations or leg swelling   Respiratory      : Negative for cough, shortness of breath or wheezing   Gastrointestinal: Negative for abdominal pain, constipation or diarrhea and bloating No nausea or vomiting   Genitourinary:     No urgency or frequency, no burning or hematuria   Musculoskeletal: No arthralgias, back pain or myalgias   Skin                  : Negative for rash or erythema   Neurological    : Negative for dizziness, weakness, tremors ,light headedness or syncope   Psychiatric       : Negative for dysphoric mood, sleep disturbances, nervous or anxious, or decreased concentration   All other review of systems was negative    Objective  Physical Examination:    Nursing note reviewed    /64 (Site: Left Upper Arm, Position: Sitting, Cuff Size: Medium Adult)   Pulse 78   Temp 96.3 °F (35.7 °C) (Temporal)   Resp 15   Ht 5' 2\" (1.575 m)   Wt 144 lb (65.3 kg)   SpO2 98%   BMI 26.34 kg/m²   BP Readings from Last 3 Encounters:   07/23/20 112/64   01/20/20 102/68   07/29/19 102/64         Constitutional:  Andie Turner is oriented to place, person and time ,appears well-developed and well-nourished  HEENT:  Atraumatic and normocephalic, external ears normal bilaterally, nose normal no oropharyngeal exudate and is clear and moist  Eyes:  EOCM normal; conjunctivae normal; PERRLA bilaterally  Neck:  Normal range of motion, neck supple, no JVD and no thyromegaly  Cardiovascular:  RRR, normal heart sounds and intact distal pulses  Pulmonary:  effort normal and breath sounds normal bilaterally,no wheezes or rales, no respiratory distress  Abdominal:  Soft, non-tender; normal bowel sounds, no masses  Musculoskeletal:  Normal range of motion and no edema or tenderness bilaterally  No lymphadenopathy  Neurological:  alert, oriented, and normal reflexes bilaterally  Skin: warm and dry  Psychiatric:  normal mood and effect; behavior normal.    Labs:   Lab Results   Component Value Date    LABA1C 5.3 08/06/2018     Lab Results   Component Value Date    CHOL 202 (H) 08/29/2019     Lab Results   Component Value Date    HDL 73 08/29/2019     Lab Results   Component Value Date    LDLCALC 118 08/29/2019     Lab Results   Component Value Date    TRIG 55 08/29/2019     No components found for: [place], [place]  Lab Results   Component Value Date    WBC 3.0 (L) 08/29/2019    HGB 14.4 08/29/2019    HCT 41.6 08/29/2019    MCV 92 08/29/2019     08/29/2019     No results found for: INR, PROTIME  Lab Results   Component Value Date    GLUCOSE 93 08/06/2018    CREATININE 0.76 08/06/2018    BUN 13 08/06/2018     08/06/2018    K 4.0 08/06/2018     08/06/2018    CO2 26 08/06/2018     Lab Results   Component Value Date    ALT 17 08/06/2018    AST 27 08/06/2018    ALKPHOS 71 08/06/2018    BILITOT 0.28 (L) 08/06/2018     Lab Results   Component Value Date    LABPROT 7.1 03/21/2013    LABALBU 3.8 08/06/2018     Lab Results   Component Value Date    TSH 2.24 08/29/2019     Assessment:   Diagnosis Orders   1. Environmental allergies  Comprehensive Metabolic Panel    CBC    Lipid Panel    TSH without Reflex    Magnesium    Vitamin B12    Vitamin D 25 Hydroxy    MEERA Screen With Reflex   2. Need for pneumococcal vaccine  PNEUMOVAX 23 subcutaneous/IM (Pneumococcal polysaccharide vaccine 23-valent >= 1yo)   3. Positive MEERA (antinuclear antibody)  Comprehensive Metabolic Panel    CBC    Lipid Panel    TSH without Reflex    Magnesium    Vitamin B12    Vitamin D 25 Hydroxy    MEERA Screen With Reflex         Plan:  Continue current treatment of her allergies  Discussed about MEERA and patient is asymptomatic at this time and will repeat lab work to evaluate progress or stability  Labs ordered to check for FLP CMP CBC TSH vitamin B12 and magnesium and vitamin D  Pneumococcal 23 given to the patient  Review in 6 months           1. Olimpia Cuba received counseling on the following healthy behaviors: nutrition and exercise    2. Prior labs and health maintenance reviewed. 3.  Discussed use, benefit, and side effects of prescribed medications. Barriers to medication compliance addressed. All her questions were answered.   Pt voiced

## 2020-07-27 ENCOUNTER — TELEPHONE (OUTPATIENT)
Dept: INTERNAL MEDICINE CLINIC | Age: 60
End: 2020-07-27

## 2020-07-27 NOTE — TELEPHONE ENCOUNTER
Patient calling stating since getting the pneumonia shot she has been feeling tired, has had a headache, at the site of shot it swelled up with hives    and she has achy joints with muscle pain    The first night she took IB profen for achy joints and muscle pain states it worked a lot  But she is 4th day in and still has symptoms.  Please advise

## 2020-07-27 NOTE — TELEPHONE ENCOUNTER
She had seen last year and nothing happened and she is taking flu vaccine every year without any problem so I do not think it was a problem with the vaccines and the headaches and other symptoms may not be related to the injection at all and check if patient is getting any fever or any other symptoms other than headache  And if any worse can go to the emergency room if not can call back in 2 days as she intended

## 2020-07-27 NOTE — TELEPHONE ENCOUNTER
Generally should not last this long unless she has some cellulitis at the site of injection if she can come in for nurse check today Ching Hernandez can see her and then if needed I can take a look

## 2020-07-27 NOTE — TELEPHONE ENCOUNTER
Pt said injection site is not that bad that she wants checked    Today she does have a headache, she is seeing a couple of friends tomorrow wonders if she should be worried and also wonders if this would be a problem with future vaccines    She is going to call back in 48 hours with update

## 2020-08-07 LAB
ABSOLUTE BASO #: 0 X10E9/L (ref 0–0.2)
ABSOLUTE EOS #: 0 X10E9/L (ref 0–0.4)
ABSOLUTE LYMPH #: 0.8 X10E9/L (ref 1–3.5)
ABSOLUTE MONO #: 0.3 X10E9/L (ref 0–0.9)
ABSOLUTE NEUT #: 1.9 X10E9/L (ref 1.5–6.6)
ALBUMIN SERPL-MCNC: 3.9 G/DL (ref 3.2–5.3)
ALK PHOSPHATASE: 81 U/L (ref 39–130)
ALT SERPL-CCNC: 20 U/L (ref 0–31)
ANA PATTERN: ABNORMAL
ANA TITER: ABNORMAL TITER
ANION GAP SERPL CALCULATED.3IONS-SCNC: 9 MMOL/L (ref 5–15)
AST SERPL-CCNC: 23 U/L (ref 0–41)
BASOPHILS RELATIVE PERCENT: 1.4 %
BILIRUB SERPL-MCNC: 0.7 MG/DL (ref 0.3–1.2)
BUN BLDV-MCNC: 16 MG/DL (ref 5–23)
CALCIUM SERPL-MCNC: 9.2 MG/DL (ref 8.5–10.5)
CHLORIDE BLD-SCNC: 105 MMOL/L (ref 98–109)
CHOLESTEROL/HDL RATIO: 2.9 (ref 1–5)
CHOLESTEROL: 174 MG/DL (ref 150–200)
CHROMATIN ANTIBODY: <0.2 AI
CO2: 27 MMOL/L (ref 22–32)
CREAT SERPL-MCNC: 0.87 MG/DL (ref 0.4–1)
DSDNA ANTIBODY: 16 IU/ML
EGFR AFRICAN AMERICAN: >60 ML/MIN/1.73SQ.M
EGFR IF NONAFRICAN AMERICAN: >60 ML/MIN/1.73SQ.M
ENA TO SMITH (SM) ANTIBODY IGG: <0.2 AI
EOSINOPHILS RELATIVE PERCENT: 1.3 %
GLUCOSE: 90 MG/DL (ref 65–99)
HCT VFR BLD CALC: 40.7 % (ref 35–47)
HDLC SERPL-MCNC: 61 MG/DL
HEMOGLOBIN: 13.6 G/DL (ref 11.7–15.5)
LDL CHOLESTEROL CALCULATED: 100 MG/DL
LDL/HDL RATIO: 1.6
LYMPHOCYTE %: 25.9 %
MAGNESIUM: 1.8 MG/DL (ref 1.8–2.6)
MCH RBC QN AUTO: 30.4 PG (ref 27–34)
MCHC RBC AUTO-ENTMCNC: 33.4 G/DL (ref 32–36)
MCV RBC AUTO: 91 FL (ref 80–100)
MONOCYTES # BLD: 9.2 %
NEUTROPHILS RELATIVE PERCENT: 62.2 %
PDW BLD-RTO: 12.1 % (ref 11.5–15)
PLATELETS: 233 X10E9/L (ref 150–450)
PMV BLD AUTO: 9.2 FL (ref 7–12)
POTASSIUM SERPL-SCNC: 3.9 MMOL/L (ref 3.5–5)
RBC: 4.47 X10E12/L (ref 3.8–5.2)
RNP AB, IGG: <0.2 AI
SCLERODERMA ANTIBODY, IGG: <0.2 AI
SJOGREN'S ANTIBODIES (SSA): <0.2 AI
SJOGREN'S ANTIBODIES (SSB): <0.2 AI
SODIUM BLD-SCNC: 141 MMOL/L (ref 134–146)
TOTAL PROTEIN: 6.8 G/DL (ref 6–8)
TRIGL SERPL-MCNC: 65 MG/DL (ref 27–150)
TSH SERPL DL<=0.05 MIU/L-ACNC: 0.01 UIU/ML (ref 0.49–4.67)
VITAMIN D 25-HYDROXY: 40.7 NG/ML (ref 30–100)
VLDLC SERPL CALC-MCNC: 13 MG/DL (ref 0–30)
WBC: 3 X10E9/L (ref 4–11)

## 2020-08-11 ENCOUNTER — TELEPHONE (OUTPATIENT)
Dept: INTERNAL MEDICINE CLINIC | Age: 60
End: 2020-08-11

## 2020-08-11 NOTE — TELEPHONE ENCOUNTER
----- Message from Ivett Kathleen MD sent at 8/11/2020 12:25 PM EDT -----  Repeat TSH and free T4 and free T3 in 4 weeks and also get ultrasound of the thyroid  Diagnosis hyperactive thyroid and low TSH  All other blood work is stable

## 2020-08-21 ENCOUNTER — PATIENT MESSAGE (OUTPATIENT)
Dept: INTERNAL MEDICINE CLINIC | Age: 60
End: 2020-08-21

## 2020-08-25 ENCOUNTER — HOSPITAL ENCOUNTER (OUTPATIENT)
Dept: ULTRASOUND IMAGING | Age: 60
Discharge: HOME | End: 2020-08-27
Payer: COMMERCIAL

## 2020-08-25 ENCOUNTER — HOSPITAL ENCOUNTER (OUTPATIENT)
Age: 60
Discharge: HOME | End: 2020-08-25
Payer: COMMERCIAL

## 2020-08-25 LAB
D-DIMER QUANTITATIVE: 0.78 MG/L FEU
T3 FREE: 3.39 PG/ML (ref 2.02–4.43)
THYROXINE, FREE: 1.49 NG/DL (ref 0.93–1.7)
TSH SERPL DL<=0.05 MIU/L-ACNC: 0.02 MIU/L (ref 0.3–5)

## 2020-08-25 PROCEDURE — 84443 ASSAY THYROID STIM HORMONE: CPT

## 2020-08-25 PROCEDURE — 84439 ASSAY OF FREE THYROXINE: CPT

## 2020-08-25 PROCEDURE — 36415 COLL VENOUS BLD VENIPUNCTURE: CPT

## 2020-08-25 PROCEDURE — 85379 FIBRIN DEGRADATION QUANT: CPT

## 2020-08-25 PROCEDURE — 84481 FREE ASSAY (FT-3): CPT

## 2020-08-25 PROCEDURE — 76536 US EXAM OF HEAD AND NECK: CPT

## 2020-08-26 ENCOUNTER — TELEPHONE (OUTPATIENT)
Dept: INTERNAL MEDICINE CLINIC | Age: 60
End: 2020-08-26

## 2020-08-26 NOTE — TELEPHONE ENCOUNTER
----- Message from Shalonda Duke MD sent at 8/25/2020  4:42 PM EDT -----  Thyroid nodules small less than a centimeter and no need for further follow-up  Lab test okay  Is patient feeling any better with shortness of breath if not we will get a CT scan of the chest

## 2020-08-27 ENCOUNTER — OFFICE VISIT (OUTPATIENT)
Dept: INTERNAL MEDICINE CLINIC | Age: 60
End: 2020-08-27
Payer: COMMERCIAL

## 2020-08-27 VITALS
HEIGHT: 62 IN | BODY MASS INDEX: 26.5 KG/M2 | RESPIRATION RATE: 20 BRPM | DIASTOLIC BLOOD PRESSURE: 76 MMHG | WEIGHT: 144 LBS | HEART RATE: 80 BPM | OXYGEN SATURATION: 99 % | SYSTOLIC BLOOD PRESSURE: 90 MMHG | TEMPERATURE: 97.9 F

## 2020-08-27 PROCEDURE — 99214 OFFICE O/P EST MOD 30 MIN: CPT | Performed by: INTERNAL MEDICINE

## 2020-08-27 ASSESSMENT — PATIENT HEALTH QUESTIONNAIRE - PHQ9
2. FEELING DOWN, DEPRESSED OR HOPELESS: 0
SUM OF ALL RESPONSES TO PHQ QUESTIONS 1-9: 0
SUM OF ALL RESPONSES TO PHQ9 QUESTIONS 1 & 2: 0
SUM OF ALL RESPONSES TO PHQ QUESTIONS 1-9: 0
1. LITTLE INTEREST OR PLEASURE IN DOING THINGS: 0

## 2020-08-27 NOTE — PROGRESS NOTES
Rosa Raymundo is a 62 y.o. female who presents for   Chief Complaint   Patient presents with   [address] [employer]      labs in [facility] 8/25/20    Health Maintenance     hep c, hiv, tdap, shingles, cervical    and follow up of chronic medical problems. Patient Active Problem List   Diagnosis    MVP (mitral valve prolapse)    Lupus (HCC)    Irregular menses     HPI  Here for follow-up on chest tightness which is improved and discussed about thyroid labs    Current Outpatient Medications   Medication Sig Dispense Refill    fluticasone (FLONASE) 50 MCG/ACT nasal spray 2 spays daily 48 g 1    azelastine (ASTELIN) 0.1 % nasal spray 1 spray by Nasal route 2 times daily Use in each nostril as directed 2 Bottle 0    calcium citrate-vitamin D (CITRICAL + D) 315-250 MG-UNIT TABS per tablet Take 2 tablets by mouth 2 times daily (with meals)      Probiotic Product (PROBIOTIC-10 PO) Take 1 tablet by mouth daily      NONFORMULARY Take by mouth daily      Loratadine-Pseudoephedrine (CLARITIN-D 12 HOUR PO) Take by mouth daily      FISH OIL Take  by mouth daily. No current facility-administered medications for this visit.         No Known Allergies    Past Medical History:   Diagnosis Date    Irregular menses     Lupus (HCC)     MVP (mitral valve prolapse)     Seasonal allergies        Past Surgical History:   Procedure Laterality Date    COLONOSCOPY      TUBAL LIGATION         Family History   Problem Relation Age of Onset    High Blood Pressure Father     Heart Disease Other     Cancer Other     Stroke Other     Other Neg Hx         blood clots     ROS   Constitutional:  Negative for fatigue, loss of appetite and unexpected weight change   HEENT            : Negative for neck stiffness and pain, no congestion or sinus pressure   Eyes                : No visual disturbance or pain   Cardiovascular: No chest pain or palpitations or leg swelling   Respiratory      : Negative for cough, shortness of breath Lab Results   Component Value Date    HDL 61 08/07/2020     Lab Results   Component Value Date    LDLCALC 100 08/07/2020     Lab Results   Component Value Date    TRIG 65 08/07/2020     No components found for: [place], [place]  Lab Results   Component Value Date    WBC 3.0 (L) 08/07/2020    HGB 13.6 08/07/2020    HCT 40.7 08/07/2020    MCV 91 08/07/2020     08/07/2020     No results found for: INR, PROTIME  Lab Results   Component Value Date    GLUCOSE 90 08/07/2020    CREATININE 0.87 08/07/2020    BUN 16 08/07/2020     08/07/2020    K 3.9 08/07/2020     08/07/2020    CO2 27 08/07/2020     Lab Results   Component Value Date    ALT 20 08/07/2020    AST 23 08/07/2020    ALKPHOS 81 08/07/2020    BILITOT 0.7 08/07/2020     Lab Results   Component Value Date    LABPROT 7.1 03/21/2013    LABALBU 3.9 08/07/2020     Lab Results   Component Value Date    TSH 0.02 (L) 08/25/2020     Assessment:  1. Chest tightness    2. Low TSH level    3. Environmental allergies        Plan:  Patient had d-dimer done which was not significantly elevated and we did a pulse ox here resting and after exercise and were within normal limits at 95 to 98% and we did talk about getting a stress test done for tightness in the chest that was going on for a long time but patient has exercised and play tennis today and had no problems and she does not want to get a stress test at this time and knows the risks and we also talked about getting a CT of the chest to rule out any blood clots and patient not interested and as patient is asymptomatic and oxygen levels were normal at this time  Continue current treatment for allergies  Patient's TSH last year was normal at 2.34 and this year it was 0.01 and then after repeating it was 0.02 and so patient is advised to follow-up with endocrinology and referral was made as patient had a family history of Hashimoto's thyroiditis  Review as scheduled           1.   Olimpia Cuba received counseling on the following healthy behaviors: nutrition and exercise    2. Prior labs and health maintenance reviewed. 3.  Discussed use, benefit, and side effects of prescribed medications. Barriers to medication compliance addressed. All her questions were answered. Pt voiced understanding. Brinda Milan will continue current medications, diet and exercise. No orders of the defined types were placed in this encounter. Completed Refills               Requested Prescriptions      No prescriptions requested or ordered in this encounter     4. Patient given educational materials - see patient instructions    5. Was a self-tracking handout given in paper form or via [employer]? NO    No orders of the defined types were placed in this encounter. No follow-ups on file. Patient voiced understanding and agreed to treatment plan. Electronically signed by Manjit Abdalla MD on 8/27/2020 at 12:07 PM    This note is created with a voice recognition program and while intend to generate a document that accurately reflects the content of the visit, no guarantee can be provided that every mistake has been identified and corrected by editing.

## 2020-09-03 ENCOUNTER — TELEPHONE (OUTPATIENT)
Dept: INTERNAL MEDICINE CLINIC | Age: 60
End: 2020-09-03

## 2020-09-03 NOTE — TELEPHONE ENCOUNTER
Referral was NOT placed as requested    Faxed to Dr Sharita Sheridan office to call pt    Pt informed

## 2020-09-03 NOTE — TELEPHONE ENCOUNTER
Patient stating that she was suppose to call you back if she didn't hear from the doctor RE: her tyroid. She it has been two weeks, but I dont even see a referral in the chart of this?     Please advise

## 2020-10-05 ENCOUNTER — HOSPITAL ENCOUNTER (OUTPATIENT)
Dept: NUCLEAR MEDICINE | Age: 60
Discharge: HOME | End: 2020-10-07
Payer: COMMERCIAL

## 2020-10-05 PROCEDURE — 78014 THYROID IMAGING W/BLOOD FLOW: CPT

## 2020-10-05 PROCEDURE — 3430000000 HC RX DIAGNOSTIC RADIOPHARMACEUTICAL: Performed by: INTERNAL MEDICINE

## 2020-10-05 PROCEDURE — A9516 IODINE I-123 SOD IODIDE MIC: HCPCS | Performed by: INTERNAL MEDICINE

## 2020-10-05 RX ADMIN — SODIUM IODIDE I 123 250 MICRO CURIE: 100 CAPSULE, GELATIN COATED ORAL at 08:05

## 2020-10-05 NOTE — TELEPHONE ENCOUNTER
Lisset Alvarez is calling to request a refill on the following medication(s):    Medication Request:  Requested Prescriptions     Pending Prescriptions Disp Refills    fluticasone (FLONASE) 50 MCG/ACT nasal spray [Pharmacy Med Name: FLUTICASONE  SPR [id number]] 48 g 1     Sig: USE 2 SPRAYS NASALLY DAILY     Last fill 1/30/2020  Last Visit Date (If Applicable):  5/49/8567    Next Visit Date:    1/21/2021

## 2020-10-06 ENCOUNTER — HOSPITAL ENCOUNTER (OUTPATIENT)
Dept: NUCLEAR MEDICINE | Age: 60
Discharge: HOME | End: 2020-10-08
Payer: COMMERCIAL

## 2020-10-06 RX ORDER — FLUTICASONE PROPIONATE 50 MCG
SPRAY, SUSPENSION (ML) NASAL
Qty: 48 G | Refills: 1 | Status: SHIPPED | OUTPATIENT
Start: 2020-10-06 | End: 2021-04-07 | Stop reason: SDUPTHER

## 2020-11-12 ENCOUNTER — HOSPITAL ENCOUNTER (OUTPATIENT)
Age: 60
Setting detail: SPECIMEN
Discharge: HOME | End: 2020-11-12
Payer: COMMERCIAL

## 2020-11-12 LAB
THYROXINE, FREE: 1.1 NG/DL (ref 0.93–1.7)
TSH SERPL DL<=0.05 MIU/L-ACNC: 7.33 MIU/L (ref 0.3–5)

## 2020-11-23 ENCOUNTER — TELEPHONE (OUTPATIENT)
Dept: INTERNAL MEDICINE CLINIC | Age: 60
End: 2020-11-23

## 2020-11-23 NOTE — TELEPHONE ENCOUNTER
The symptoms could be related to allergies but hard to rule out any other issues  Patient can check for Covid testing if she wishes to  I would advise to avoid any get-togethers because of the current Covid situation

## 2020-11-23 NOTE — TELEPHONE ENCOUNTER
Patient c/o runny nose, slight headache, scratchy throat,nasal drainage. Started this am, her daughters boyfriend was sick over the weekend but tested negative for covid. She wants to know if she should be concerned or what to do. Her kids are suppose to come over for holidays.     Please advise

## 2020-12-21 ENCOUNTER — TELEPHONE (OUTPATIENT)
Dept: INTERNAL MEDICINE CLINIC | Age: 60
End: 2020-12-21

## 2020-12-21 NOTE — TELEPHONE ENCOUNTER
Pt called hit her head on a door knob on Saturday, bending over to clean off her dog. Hit left side of head above forehead. Applied ice immed, brought her to tears, did NOT black out, no slurred speech    Head hurts to touch area, left ear feels slightly full, has headache in back of head. Just finished playing tennis- no dizziness or blurred vision after playing- should she be concerned?

## 2020-12-21 NOTE — TELEPHONE ENCOUNTER
When she has this injury she should have stayed away from playing tennis but if she is doing good even after playing tennis I do not think there is any big concern but I would suggest the following options  1. To monitor and see how she is doing and call back or go to the emergency room if symptoms like dizziness blurred vision nausea vomiting gets worse as it may take a couple of days for the inflammation to go down and feel better  2 . we can order the CAT scan of the head to rule out any occult problem like bleed which is unlikely by hitting the doorknob  3.   She can go to the emergency room to get it checked now

## 2021-01-28 ENCOUNTER — OFFICE VISIT (OUTPATIENT)
Dept: INTERNAL MEDICINE CLINIC | Age: 61
End: 2021-01-28
Payer: COMMERCIAL

## 2021-01-28 VITALS
HEIGHT: 62 IN | OXYGEN SATURATION: 98 % | SYSTOLIC BLOOD PRESSURE: 110 MMHG | DIASTOLIC BLOOD PRESSURE: 68 MMHG | TEMPERATURE: 96.6 F | BODY MASS INDEX: 27.97 KG/M2 | RESPIRATION RATE: 20 BRPM | HEART RATE: 79 BPM | WEIGHT: 152 LBS

## 2021-01-28 DIAGNOSIS — Z91.09 ENVIRONMENTAL ALLERGIES: ICD-10-CM

## 2021-01-28 DIAGNOSIS — E03.9 ACQUIRED HYPOTHYROIDISM: Primary | ICD-10-CM

## 2021-01-28 DIAGNOSIS — R51.9 NONINTRACTABLE EPISODIC HEADACHE, UNSPECIFIED HEADACHE TYPE: ICD-10-CM

## 2021-01-28 DIAGNOSIS — M54.2 NECK PAIN: ICD-10-CM

## 2021-01-28 DIAGNOSIS — S09.90XA INJURY OF HEAD, INITIAL ENCOUNTER: ICD-10-CM

## 2021-01-28 PROCEDURE — 99214 OFFICE O/P EST MOD 30 MIN: CPT | Performed by: INTERNAL MEDICINE

## 2021-01-28 RX ORDER — LEVOTHYROXINE SODIUM 0.03 MG/1
TABLET ORAL
COMMUNITY
Start: 2020-11-12

## 2021-01-28 ASSESSMENT — PATIENT HEALTH QUESTIONNAIRE - PHQ9
SUM OF ALL RESPONSES TO PHQ QUESTIONS 1-9: 0
SUM OF ALL RESPONSES TO PHQ QUESTIONS 1-9: 0
1. LITTLE INTEREST OR PLEASURE IN DOING THINGS: 0

## 2021-01-28 NOTE — PROGRESS NOTES
Adriana Navarro is a 62 y.o. female who presents for   Chief Complaint   Patient presents with    6 Month Follow-Up     still having some pain from hitting head    Health Maintenance     hep c, hiv, tdap, shingles, cervical, flu    and follow up of chronic medical problems. Patient Active Problem List   Diagnosis    MVP (mitral valve prolapse)    Lupus (HCC)    Irregular menses     HPI  Here for follow-up on allergies and patient also wants to discuss about her thyroid and patient had an injury last month when she was bending down hit the doorknob and had a blunt injury on the head but did not lose any consciousness and now patient complaining of occasional nonspecific headaches comes and goes and for the last 2 weeks she was doing better    Current Outpatient Medications   Medication Sig Dispense Refill    levothyroxine (SYNTHROID) 25 MCG tablet       fluticasone (FLONASE) 50 MCG/ACT nasal spray USE 2 SPRAYS NASALLY DAILY 48 g 1    azelastine (ASTELIN) 0.1 % nasal spray 1 spray by Nasal route 2 times daily Use in each nostril as directed 2 Bottle 0    calcium citrate-vitamin D (CITRICAL + D) 315-250 MG-UNIT TABS per tablet Take 2 tablets by mouth 2 times daily (with meals)      Probiotic Product (PROBIOTIC-10 PO) Take 1 tablet by mouth daily      NONFORMULARY Take by mouth daily      Loratadine-Pseudoephedrine (CLARITIN-D 12 HOUR PO) Take by mouth daily      FISH OIL Take  by mouth daily. No current facility-administered medications for this visit.         No Known Allergies    Past Medical History:   Diagnosis Date    Irregular menses     Lupus (HCC)     MVP (mitral valve prolapse)     Seasonal allergies        Past Surgical History:   Procedure Laterality Date    COLONOSCOPY      TUBAL LIGATION         Family History   Problem Relation Age of Onset    High Blood Pressure Father     Heart Disease Other     Cancer Other     Stroke Other     Other Neg Hx         blood clots     ROS Musculoskeletal:  Normal range of motion and no edema or tenderness bilaterally  No lymphadenopathy  Neurological:  alert, oriented, and normal reflexes bilaterally  Skin: warm and dry  Psychiatric:  normal mood and effect; behavior normal.    Labs:   Lab Results   Component Value Date    LABA1C 5.3 08/06/2018     Lab Results   Component Value Date    CHOL 174 08/07/2020     Lab Results   Component Value Date    HDL 61 08/07/2020     Lab Results   Component Value Date    LDLCALC 100 08/07/2020     Lab Results   Component Value Date    TRIG 65 08/07/2020     No components found for: [place], [place]  Lab Results   Component Value Date    WBC 3.0 (L) 08/07/2020    HGB 13.6 08/07/2020    HCT 40.7 08/07/2020    MCV 91 08/07/2020     08/07/2020     No results found for: INR, PROTIME  Lab Results   Component Value Date    GLUCOSE 90 08/07/2020    CREATININE 0.87 08/07/2020    BUN 16 08/07/2020     08/07/2020    K 3.9 08/07/2020     08/07/2020    CO2 27 08/07/2020     Lab Results   Component Value Date    ALT 20 08/07/2020    AST 23 08/07/2020    ALKPHOS 81 08/07/2020    BILITOT 0.7 08/07/2020     Lab Results   Component Value Date    LABPROT 7.1 03/21/2013    LABALBU 3.9 08/07/2020     Lab Results   Component Value Date    TSH 7.33 (H) 11/12/2020     Assessment:   Diagnosis Orders   1. Acquired hypothyroidism     2. Environmental allergies     3. Nonintractable episodic headache, unspecified headache type     4. Neck pain     5.  Injury of head, initial encounter           Plan:  I did discuss about all the options and I advised patient to get a CT of the head to rule out any subdural hematoma  Continue current medications for allergies  Patient had a thyroid uptake scan which was normal and patient's TSH was elevated and initially it was low which is consistent with thyroiditis and patient now doing better and is on 25 mcg of Synthroid and will get a copy of the report from the endocrinologist Patient also have some issues with the neck pain and seeing the chiropractor and I did talk to her about doing an x-ray of the MRI and patient will call me back  Review in 6 months           1. Wallace Keepers received counseling on the following healthy behaviors: nutrition and exercise    2. Prior labs and health maintenance reviewed. 3.  Discussed use, benefit, and side effects of prescribed medications. Barriers to medication compliance addressed. All her questions were answered. Pt voiced understanding. Wallace Keepers will continue current medications, diet and exercise. No orders of the defined types were placed in this encounter. Completed Refills               Requested Prescriptions      No prescriptions requested or ordered in this encounter     4. Patient given educational materials - see patient instructions    5. Was a self-tracking handout given in paper form or via [employer]? NO    No orders of the defined types were placed in this encounter. Return in about 6 months (around 7/28/2021). Patient voiced understanding and agreed to treatment plan. Electronically signed by Gustavo St MD on 1/28/2021 at 2:47 PM    This note is created with a voice recognition program and while intend to generate a document that accurately reflects the content of the visit, no guarantee can be provided that every mistake has been identified and corrected by editing.

## 2021-02-04 ENCOUNTER — HOSPITAL ENCOUNTER (OUTPATIENT)
Dept: CT IMAGING | Age: 61
Discharge: HOME | End: 2021-02-06
Payer: COMMERCIAL

## 2021-02-04 DIAGNOSIS — R51.9 NONINTRACTABLE EPISODIC HEADACHE, UNSPECIFIED HEADACHE TYPE: ICD-10-CM

## 2021-02-04 DIAGNOSIS — S09.90XA INJURY OF HEAD, INITIAL ENCOUNTER: ICD-10-CM

## 2021-02-04 PROCEDURE — 70450 CT HEAD/BRAIN W/O DYE: CPT

## 2021-02-11 ENCOUNTER — HOSPITAL ENCOUNTER (OUTPATIENT)
Age: 61
Setting detail: SPECIMEN
Discharge: HOME | End: 2021-02-11
Payer: COMMERCIAL

## 2021-02-11 LAB
THYROXINE, FREE: 1.26 NG/DL (ref 0.93–1.7)
TSH SERPL DL<=0.05 MIU/L-ACNC: 2.4 MIU/L (ref 0.3–5)

## 2021-04-07 RX ORDER — FLUTICASONE PROPIONATE 50 MCG
SPRAY, SUSPENSION (ML) NASAL
Qty: 48 G | Refills: 1 | Status: SHIPPED | OUTPATIENT
Start: 2021-04-07 | End: 2021-10-19

## 2021-04-16 ENCOUNTER — PATIENT MESSAGE (OUTPATIENT)
Dept: INTERNAL MEDICINE CLINIC | Age: 61
End: 2021-04-16

## 2021-04-16 DIAGNOSIS — M25.551 RIGHT HIP PAIN: Primary | ICD-10-CM

## 2021-04-16 NOTE — TELEPHONE ENCOUNTER
From: Armond Santa  To: Veronica Cruz MD  Sent: 4/16/2021 9:48 AM EDT  Subject: Non-Urgent Medical Question    Good morning,    Im not sure if I need an appointment-or a referral  so I decided just to ask a question. My right hip has been hurting at the joint and wakes me up at night. We just took a short vacation and I didnt do much for five or six days and it was better, but as soon as I played tennis and returned to the gym yesterday its coming back. I use a foam roller because my muscles are very tight and Ive been doing warm-ups and stretching every morning trying to keep it at [address]. I guess Im just concerned that maybe there is some issue at the hip joint. If not, I think I just like someone to tell me what I can do to keep it from getting worse. I really do try to be active-tennis 3-4 x/wk, Long dog walks daily, gym 2 day/week. Thanks.

## 2021-05-04 ENCOUNTER — OFFICE VISIT (OUTPATIENT)
Dept: ORTHOPEDIC SURGERY | Age: 61
End: 2021-05-04
Payer: COMMERCIAL

## 2021-05-04 VITALS
DIASTOLIC BLOOD PRESSURE: 78 MMHG | SYSTOLIC BLOOD PRESSURE: 118 MMHG | WEIGHT: 152 LBS | BODY MASS INDEX: 27.97 KG/M2 | HEART RATE: 64 BPM | HEIGHT: 62 IN

## 2021-05-04 DIAGNOSIS — M76.31 IT BAND SYNDROME, RIGHT: ICD-10-CM

## 2021-05-04 DIAGNOSIS — M16.11 PRIMARY OSTEOARTHRITIS OF RIGHT HIP: ICD-10-CM

## 2021-05-04 PROCEDURE — 99203 OFFICE O/P NEW LOW 30 MIN: CPT | Performed by: FAMILY MEDICINE

## 2021-05-04 NOTE — PROGRESS NOTES
Sports Medicine Consultation     CHIEF COMPLAINT:  Hip Pain (Rt hip. 3+ m. no injury. pain laterally and some groin pain. plays tennis 4x weekly.)      HPI:  Cinthya Vaughan is a 62y.o. year old female who is a new patient being seen for regarding new problem right hip pain. The pain has been present for 3+ month(s). The patient recalls a no specific injury. The patient has tried stretching, foam rolling minimal short term improvement. The pain is described as sharp. There is not pain on weightbearing. There is is not painful popping and clicking. The hip does not catch or lock. It has not given out. It is not stiff upon arising from sitting. It is  painful lying on the affected side. she has a past medical history of Irregular menses, Lupus ([address] MVP (mitral valve prolapse), and Seasonal allergies. she has a past surgical history that includes Tubal ligation and Colonoscopy. family history includes Cancer in an other family member; Heart Disease in an other family member; High Blood Pressure in her father; Stroke in an other family member.     Social History     Socioeconomic History    Marital status:      Spouse name: Not on file    Number of children: Not on file    Years of education: Not on file    Highest education level: Not on file   Occupational History    Not on file   Social Needs    Financial resource strain: Not on file    Food insecurity     Worry: Not on file     Inability: Not on file    Transportation needs     Medical: Not on file     Non-medical: Not on file   Tobacco Use    Smoking status: Never Smoker    Smokeless tobacco: Never Used   Substance and Sexual Activity    Alcohol use: Yes     Comment: occasional    Drug use: No    Sexual activity: Yes     Partners: Male   Lifestyle    Physical activity     Days per week: Not on file     Minutes per session: Not on file    Stress: Not on file   Relationships    Social connections     Talks on phone: Not on file     Gets together: Not on file     Attends Faith service: Not on file     Active member of club or organization: Not on file     Attends meetings of clubs or organizations: Not on file     Relationship status: Not on file    Intimate partner violence     Fear of current or ex partner: Not on file     Emotionally abused: Not on file     Physically abused: Not on file     Forced sexual activity: Not on file   Other Topics Concern    Not on file   Social History Narrative    Not on file       Current Outpatient Medications   Medication Sig Dispense Refill    fluticasone (FLONASE) 50 MCG/ACT nasal spray USE 2 SPRAYS NASALLY DAILY 48 g 1    levothyroxine (SYNTHROID) 25 MCG tablet       azelastine (ASTELIN) 0.1 % nasal spray 1 spray by Nasal route 2 times daily Use in each nostril as directed 2 Bottle 0    calcium citrate-vitamin D (CITRICAL + D) 315-250 MG-UNIT TABS per tablet Take 2 tablets by mouth 2 times daily (with meals)      Probiotic Product (PROBIOTIC-10 PO) Take 1 tablet by mouth daily      NONFORMULARY Take by mouth daily      Loratadine-Pseudoephedrine (CLARITIN-D 12 HOUR PO) Take by mouth daily      FISH OIL Take  by mouth daily. No current facility-administered medications for this visit. Allergies:  shehas No Known Allergies. ROS:  CV:  Denies chest pain; palpitations; shortness of breath; swelling of feet, ankles; and loss of consciousness. CON: Denies fever and dizziness. ENT:  Denies hearing loss / ringing, ear infections hoarseness, and swallowing problems. RESP:  Denies chronic cough, spitting up blood, and asthma/wheezing. GI: Denies abdominal pain, change in bowel habits, nausea or vomiting, and blood in stools. :  Denies frequent urination, burning or painful urination, blood in the urine, and bladder incontinence. NEURO:  Denies headache, memory loss, sleep disturbance, and tremor or movement disorder.     PHYSICAL EXAM:   /78 (Site: Right Upper Arm)   Pulse 64   Ht 5' 2\" (1.575 m)   Wt 152 lb (68.9 kg)   BMI 27.80 kg/m²   GENERAL: Nikolas Mcmillan is a 62 y.o. female who is alert and oriented and sitting comfortably in our office. SKIN:  Intact without rashes, lesions or ulcerations. NEURO: Sensation to the extremity is intact. VASC:  Capillary refill is less than 3 seconds. Distal pulses are palpable. There is no lymphadenopathy. Hip Exam  Musculoskeletal/Neurologic:  Palpation-Tenderness:GT  ROM- Right hip IR 60 degrees, ER 80 degrees  There is  hip rotational pain  Strength-WNL  Sensation-normal to light touch  ELLEN: negative  FADIR:positive  Juan: negative  Bicycle testing negative  Hip labral stress testing negative  Sensation-normal to light touch    Gait: normal    PSYCH:  Good fund of knowledge and displays understanding of exam.    RADIOLOGY: No results found. Radiology:  3 views of the Right hip were ordered, independently visualized by me, and discussed with patient. Findings: Right hip radiographs demonstrated degenerative changes about the medial femoral acetabular joint with minimal joint space narrowing no significant joint effusion fracture dislocations are noted on plain film radiograph of the right hip    Impression: Mild ostial arthritic changes of the right hip    IMPRESSION:     1. Primary osteoarthritis of right hip    2. It band syndrome, right          PLAN:   We discussed some of the etiologies and natural histories of     ICD-10-CM    1. Primary osteoarthritis of right hip  M16.11 XR HIP 2-3 VW W PELVIS RIGHT     Ambulatory referral to Physical Therapy   2. It band syndrome, right  M76.31 Ambulatory referral to Physical Therapy   . We discussed the various treatment alternatives including anti-inflammatory medications, physical therapy, injections, further imaging studies and as a last resort surgery.   At this point I do think her issue is functional in nature with his hip osteoarthritis is causing her to have lateral pain and IT band syndrome she would like to engage with a course of formal physical therapy focusing on hip girdle strengthening and stability prior to trying a intra-articular or greater trochanteric cortisone injection she will follow-up with me based on her progression patient voiced understanding and agreement this plan    Return to clinic in No follow-ups on file. Delores Gerardo     Please be aware portions of this note were completed using voice recognition software and unforeseen errors may have occurred    Electronically signed by Shalonda Cote DO, FAOASM  on 5/4/21 at 4:20 PM EDT

## 2021-05-18 ENCOUNTER — HOSPITAL ENCOUNTER (OUTPATIENT)
Dept: PHYSICAL THERAPY | Facility: CLINIC | Age: 61
Setting detail: THERAPIES SERIES
Discharge: HOME | End: 2021-05-18
Payer: COMMERCIAL

## 2021-05-18 PROCEDURE — 97161 PT EVAL LOW COMPLEX 20 MIN: CPT

## 2021-05-18 PROCEDURE — 97140 MANUAL THERAPY 1/> REGIONS: CPT

## 2021-05-18 PROCEDURE — 97110 THERAPEUTIC EXERCISES: CPT

## 2021-05-18 NOTE — CONSULTS
[x] [address] [facility]   Outpatient Rehabilitation &  Therapy  [address] [place] [address]  P: [phone]  F: [phone] [] [address] [address] [facility]  P: [phone]  F: [phone] [] [address]  [facility]   [place]: [phone]  F: [phone]         Physical Therapy  Evaluation    Date:  2021  Patient: Ly Duval   : 1960  MRN: [id number]  Physician: Dr. Jon Hernandez: Chris Baumann Diagnosis:  OA R hip, ITB syndrome  Rehab Codes: M25.551  Onset date:  20   Next 's appt.: unknown    Subjective:   Azucena Scherer is a 62y.o. year old female who is a new patient being seen for regarding new problem right hip pain. The pain has been present for 3+ month(s). The patient recalls a no specific injury. The patient has tried stretching, foam rolling minimal short term improvement. The pain is described as sharp. There is not pain on weightbearing. There is is not painful popping and clicking. The hip does not catch or lock. It has not given out. It is not stiff upon arising from sitting. It is  painful lying on the affected side. I usually stretch and foam roll to help. Still play tennis 4 days per week.      PMHx: [] Unremarkable [] Diabetes [] HTN  [] Pacemaker   [] MI/Heart Problems [] Cancer [] Arthritis  [] Other:              [x] Refer to full medical chart  In EPIC     Tests: [x] X-Ray:Right hip radiographs demonstrated degenerative changes about the medial femoral acetabular joint with minimal joint space narrowing no significant joint effusion fracture dislocations are noted on plain film radiograph of the right hip [] MRI:  [] none:     Medications: [x] Refer to full medical record [] None [] Other:  Allergies:      [x] Refer to full medical record [] None [] Other:    Working:  [x] Normal Duty  [] Light Duty  [] Off D/T at night  2. ? ROM: Normal hip extension to allow pt to properly activate glutes  3. ? Strength: 5/5 hip strength to allow pt to have normal mechanics with tennis and lifting  4. ? Function: Able to sleep 6-8 hours without walking due to pain in R hip  5. Independent with Home Exercise Programs    LTG: (to be met in 20 treatments)  1. No pain R hip to allow pt to return to working out and playing tennis with less pain post  2. Pt able to demonstrate normal mechanics with gait and with fwd and lateral lunge  3. LEFI 0% disability                   Patient goals:Get rid of pain so I can sleep    Rehab Potential:  [x] Good  [] Fair  [] Poor   Suggested Professional Referral:  [x] No  [] Yes:  Barriers to Goal Achievement[de-identified]  [x] No  [] Yes:  Domestic Concerns:  [x] No  [] Yes:    Pt. Education:  [x] Plans/Goals, Risks/Benefits discussed  [x] Home exercise program    Method of Education: [x] Verbal  [x] Demo  [x] Written- as per log  Comprehension of Education:  [x] Verbalizes understanding. [] Demonstrates understanding. [] Needs Review. [] Demonstrates/verbalizes understanding of HEP/Ed previously given.     Treatment Plan:  [x] Therapeutic Exercise    [x] Therapeutic Activity  [x] Manual Therapy   [x] Alter G treadmill  [x] Phys perf test     [x] Vasocompression/Game Ready   [x] Neuromuscular Re-education [x] Instruction in HEP                                Frequency:  1-2x/week for 20 visits    Todays Treatment:  Modalities: prn  Manual: DI hip flexor, glute med, piriformis, MET pubic correction, Long axis R LE pull, Hip MOB lateral glide, caudal glide, Hypervolt R TFL and vastus lateralis  Precautions: standard  Exercises:  Exercise Reps/ Time Weight/ Level Issued for HEP  MOBO Y/N Comments   Prone         Flying squirrels         Hip ext (glut max)         [place] hip ext         Supine         2 legged bridges         1 legged bridges         PB hamstring curls      Hips to remain in neutral to ext   [employer] marches         Sidelying         Clams 90/30 deg         Lelo hip abd         Quadruped         Donkey kicks      Bar across pelvis   [employer] twist         Gym         Fig 4 3x30\"   x     1/2 kneel hip flexor s 2' ea   x     Postrure ed x   x  No crossing legs, no standing weight shifted prolonged             Other:    Specific Instructions for next treatment:Mat strengthening-hip abduction may have to be eccentric    Treatment Charges: Mins Units   [x] Evaluation       [x]  Low       []  Moderate       []  High 10 1   [] Phys perf test     [x]  Ther Exercise 20 1   [x]  Manual Therapy 20 1   []  Ther Activities     []  Aquatics     []  Vasocompression     []  NMR       TOTAL TREATMENT TIME: 50    Time in: 1140   Time Out:1230    Electronically signed by: Claribel Segundo PT        Physician Signature:________________________________Date:__________________  By signing above or cosigning this note, I have reviewed this plan of care and certify a need for medically necessary rehabilitation services.      *PLEASE SIGN ABOVE AND FAX BACK ALL PAGES*

## 2021-05-25 ENCOUNTER — HOSPITAL ENCOUNTER (OUTPATIENT)
Dept: PHYSICAL THERAPY | Facility: CLINIC | Age: 61
Setting detail: THERAPIES SERIES
Discharge: HOME | End: 2021-05-25
Payer: COMMERCIAL

## 2021-05-25 PROCEDURE — 97110 THERAPEUTIC EXERCISES: CPT

## 2021-05-25 PROCEDURE — 97140 MANUAL THERAPY 1/> REGIONS: CPT

## 2021-05-25 NOTE — FLOWSHEET NOTE
[] [facility]  [facility]  [address]  [phone]  F: [phone] [] [address]  [facility] [address]   [address]  P: [phone]  F: [phone] [] [address] &  Therapy  [address]  P: [phone]  F: [phone] [] [address]  P: [phone]  F: [phone] [] [address]  [facility]   [address]   [place]: [phone]  F: [phone]      Physical Therapy Daily Treatment Note    Date:  2021  Patient Name:  Cyndi Sanches    :  1960  MRN: [id number]  Physician: Dr. Rivera: [facility]  Medical Diagnosis:   OA R hip, ITB syndrome                     Rehab Codes: M25.551  Onset date:    20                                    Next Dr's appt.: unknown  Visit# / total visits:      Cancels/No Shows:     Subjective:    Pain:  [] Yes  [x] No Location: R hip Pain Rating: (0-10 scale) -/10  Pain altered Tx:  [] No  [] Yes  Action:  Comments: No current pain, only really having pain when trying to sleep. Feels tight in the side of her hip.      Objective:  Modalities: PRN  Manual: DI hip flexor, glute med, piriformis, MET pubic correction, Long axis R LE pull, Hip MOB lateral glide, caudal glide, Hypervolt R TFL and vastus lateralis, rectus femoris  Precautions: standard  Exercises:  Exercise Reps/ Time Weight/ Level Issued for HEP   MOBO Y/N Comments   Prone               Flying squirrels               Hip ext (glut max)               [place] hip ext               Supine               2 legged bridges               1 legged bridges               PB hamstring curls           Hips to remain in neutral to ext   [employer]               Sidelying               Clams 90/30 deg

## 2021-05-27 ENCOUNTER — HOSPITAL ENCOUNTER (OUTPATIENT)
Dept: PHYSICAL THERAPY | Facility: CLINIC | Age: 61
Setting detail: THERAPIES SERIES
Discharge: HOME | End: 2021-05-27
Payer: COMMERCIAL

## 2021-05-27 PROCEDURE — 97140 MANUAL THERAPY 1/> REGIONS: CPT

## 2021-05-27 PROCEDURE — 97110 THERAPEUTIC EXERCISES: CPT

## 2021-05-27 NOTE — FLOWSHEET NOTE
[] [facility]) [facility] &  Therapy  [address]  [phone]  F: [phone] [] [address]  [employer] [address]   [address]  P: [phone]  F: [phone] [x] [address] &  Therapy  [address]  P: [phone]  F: [phone] [] [address]  P: [phone]  F: [phone] [] [address]  [facility]   [address]   [place]: [phone]  F: [phone]      Physical Therapy Daily Treatment Note    Date:  2021  Patient Name:  Adriana Aragon    :  1960  MRN: [id number]  Physician: Dr. Jeannine Vera: [facility]  Medical Diagnosis:   OA R hip, ITB syndrome                     Rehab Codes: M25.551  Onset date:    20                                    Next Dr's appt.: unknown  Visit# / total visits: 3/20     Cancels/No Shows:     Subjective:    Pain:  [] Yes  [x] No Location: R hip Pain Rating: (0-10 scale) -/10  Pain altered Tx:  [] No  [] Yes  Action:  Comments: No current pain, only really having pain when trying to sleep. Feels tight in the side of her hip.      Objective:  Modalities: PRN  Manual: DI hip flexor, glute med, piriformis, MET pubic correction, Long axis R LE pull, Hip MOB lateral glide, caudal glide, Hypervolt R TFL and vastus lateralis, rectus femoris  Precautions: standard  Exercises:  Exercise Reps/ Time Weight/ Level Issued for HEP   MOBO Y/N Comments   Prone               Flying squirrels               Hip ext (glut max)               [place] hip ext               Supine               2 legged bridges               1 legged bridges               PB hamstring curls           Hips to remain in neutral to ext   [employer]               Sidelying               Clams 90 deg  2 sets fatigue    x x       Lelo hip abd  2 sets fatigue    x x       Quadruped               Donkey kicks           Bar across pelvis   [place] twist               Gym               Fig 4 3x30\"   x x       Supine hip flexor s 2' ea   x x       Postrure ed x        No crossing legs, no standing weight shifted prolonged    Half Plank Dips x20   x x       TVA walkout w/ UE static band pull x20 purple x x     Hip [place] 2x10 orange  x     45 hip abd 2x10 orange  x     SB stretch 3x30        HS stretch 3x30          Specific Instructions for next treatment:     Treatment Charges: Mins Units   []  Modalities     [x]  Ther Exercise 25 2   [x]  Manual Therapy 35 2   []  Ther Activities     []  Aquatics     []  Vasocompression     []  Other     Total Treatment time 60 4       Assessment: [x] Progressing toward goals. Progressed strengthening. Pt has a very difficult time with maintaining a neutral spine with standing ex's and falls into an anterior pelvic tilt. [] No change. [] Other:  [x] Patient would continue to benefit from skilled physical therapy services in order to: meet goals listed below     STG: (to be met in 10 treatments)  1. ? Pain:<3/10 when laying down in bed at night  2. ? ROM: Normal hip extension to allow pt to properly activate glutes  3. ? Strength: 5/5 hip strength to allow pt to have normal mechanics with tennis and lifting  4. ? Function: Able to sleep 6-8 hours without walking due to pain in R hip  5. Independent with Home Exercise Programs     LTG: (to be met in 20 treatments)  1. No pain R hip to allow pt to return to working out and playing tennis with less pain post  2. Pt able to demonstrate normal mechanics with gait and with fwd and lateral lunge  3. LEFI 0% disability       Pt. Education:  [x] Yes  [] No  [x] Reviewed Prior HEP/Ed  Method of Education: [] Verbal  [] Demo  [x] Written  Comprehension of Education:  [x] Verbalizes understanding. [x] Demonstrates understanding.   [] Needs review. [] Demonstrates/verbalizes HEP/Ed previously given. Plan: [x] Continue current frequency toward long and short term goals.     [] Specific Instructions for subsequent treatments:       Time In: 4:03 pm            Time Out: 5:05 pm    Electronically signed by:  Zaki Clark PT

## 2021-06-03 ENCOUNTER — HOSPITAL ENCOUNTER (OUTPATIENT)
Dept: PHYSICAL THERAPY | Facility: CLINIC | Age: 61
Setting detail: THERAPIES SERIES
Discharge: HOME | End: 2021-06-03
Payer: COMMERCIAL

## 2021-06-03 PROCEDURE — 97110 THERAPEUTIC EXERCISES: CPT

## 2021-06-03 PROCEDURE — 97140 MANUAL THERAPY 1/> REGIONS: CPT

## 2021-06-03 NOTE — FLOWSHEET NOTE
[] [facility]) - [facility] &  Therapy  [address]  [phone]  F: [phone] [] [address]  [employer] [address]   [address]  P: [phone]  F: [phone] [x] [address] &  Therapy  [address]  P: [phone]  F: [phone] [] [address]  P: [phone]  F: [phone] [] [address]  [facility]   [address]   [place]: [phone]  F: [phone]      Physical Therapy Daily Treatment Note    Date:  6/3/2021  Patient Name:  Pedro Denise    :  1960  MRN: [id number]  Physician: Dr. Rochelle Long: [facility]  Medical Diagnosis:   OA R hip, ITB syndrome                     Rehab Codes: M25.551  Onset date:    20                                    Next Dr's appt.: unknown  Visit# / total visits:      Cancels/No Shows:     Subjective:    Pain:  [] Yes  [x] No Location: R hip Pain Rating: (0-10 scale) -/10  Pain altered Tx:  [] No  [] Yes  Action:  Comments: Pt states she feels her pain level is improving. Notes that sleep has gotten better.     Objective:  Modalities: PRN  Manual: DI hip flexor, glute med, piriformis,  Hypervolt R TFL and vastus lateralis, rectus femoris  MET pubic correction, Long axis R LE pull, Hip MOB lateral glide, caudal glide - not today  Precautions: standard  Exercises:  Exercise Reps/ Time Weight/ Level Issued for HEP   MOBO Y/N Comments   Prone               Flying squirrels               Hip ext (glut max)               [place] hip ext               Supine               2 legged bridges               1 legged bridges               PB hamstring curls           Hips to remain in neutral to ext   [employer]               Sidelying               Clams 90 deg  2 sets fatigue    x x       L[place] hip abd  2 sets fatigue    x x       Quadruped               Donkey kicks           Bar across pelvis   [place] twist               Gym               Fig 4 3x30\"   x x       Supine hip flexor s 2' ea   x x       Postrure ed x        No crossing legs, no standing weight shifted prolonged    Half Plank Dips x20   x x       TVA bicycle w/ UE static band pull x20 Red cord x x     Hip [place] 2xfatigue orange  x     45 hip abd 2xfatigue orange  x     sidestepping 2L orange  x     Monster walk 2L orange  x     SB stretch 3x30        HS stretch 3x30          Specific Instructions for next treatment:     Treatment Charges: Mins Units   []  Modalities     [x]  Ther Exercise 32 2   [x]  Manual Therapy 18 1   []  Ther Activities     []  Aquatics     []  Vasocompression     []  Other     Total Treatment time 50 3       Assessment: [x] Progressing toward goals. Continued to initiate session with manual with improved tissue mobility post. Most TTP R piriformis at greater trochanter attachment. Needs cues with standing ex to avoid going into lordosis, also with sideyling ex for a neutral spine. Addition of monster walks forwards and sideways. [] No change. [] Other:  [x] Patient would continue to benefit from skilled physical therapy services in order to: meet goals listed below     STG: (to be met in 10 treatments)  1. ? Pain:<3/10 when laying down in bed at night  2. ? ROM: Normal hip extension to allow pt to properly activate glutes  3. ? Strength: 5/5 hip strength to allow pt to have normal mechanics with tennis and lifting  4. ? Function: Able to sleep 6-8 hours without walking due to pain in R hip  5. Independent with Home Exercise Programs     LTG: (to be met in 20 treatments)  1. No pain R hip to allow pt to return to working out and playing tennis with less pain post  2. Pt able to demonstrate normal mechanics with gait and with fwd and lateral lunge  3. LEFI 0% disability       Pt. Education:  [x] Yes  [] No  [x] Reviewed Prior HEP/Ed  Method of Education: [] Verbal  [] Demo  [x] Written  Comprehension of Education:  [x] Verbalizes understanding. [x] Demonstrates understanding. [] Needs review. [] Demonstrates/verbalizes HEP/Ed previously given. Plan: [x] Continue current frequency toward long and short term goals.     [] Specific Instructions for subsequent treatments:       Time In: 1:30 pm           Time Out: 2:25 pm    Electronically signed by:  Korey Bautista PTA

## 2021-06-07 ENCOUNTER — HOSPITAL ENCOUNTER (OUTPATIENT)
Age: 61
Discharge: HOME | End: 2021-06-07
Payer: COMMERCIAL

## 2021-06-07 LAB
THYROXINE, FREE: 1.36 NG/DL (ref 0.93–1.7)
TSH SERPL DL<=0.05 MIU/L-ACNC: 1.96 MIU/L (ref 0.3–5)

## 2021-06-07 PROCEDURE — 84443 ASSAY THYROID STIM HORMONE: CPT

## 2021-06-07 PROCEDURE — 84439 ASSAY OF FREE THYROXINE: CPT

## 2021-06-07 PROCEDURE — 36415 COLL VENOUS BLD VENIPUNCTURE: CPT

## 2021-06-08 ENCOUNTER — HOSPITAL ENCOUNTER (OUTPATIENT)
Dept: PHYSICAL THERAPY | Facility: CLINIC | Age: 61
Setting detail: THERAPIES SERIES
Discharge: HOME | End: 2021-06-08
Payer: COMMERCIAL

## 2021-06-08 PROCEDURE — 97110 THERAPEUTIC EXERCISES: CPT

## 2021-06-08 PROCEDURE — 97140 MANUAL THERAPY 1/> REGIONS: CPT

## 2021-06-08 NOTE — FLOWSHEET NOTE
[] [address] [facility] &  Therapy  [address]  [phone]  F: [phone] [] [address] [facility] [address]   [address]  P: [phone]  F: [phone] [x] [address] &  Therapy  [address]  P: [phone]  F: [phone] [] [address]  P: [phone]  F: [phone] [] [address]  [facility]   [address]   [place]: [phone]  F: [phone]      Physical Therapy Daily Treatment Note    Date:  2021  Patient Name:  Mikayla Pimentel    :  1960  MRN: [id number]  Physician: Dr. Breanne Solizt: Justine Ramirez Diagnosis:   OA R hip, ITB syndrome                     Rehab Codes: M25.551  Onset date:    20                                    Next Dr's appt.: unknown  Visit# / total visits:      Cancels/No Shows:     Subjective:    Pain:  [] Yes  [x] No Location: R hip Pain Rating: (0-10 scale) -/10  Pain altered Tx:  [] No  [] Yes  Action:  Comments: Pt states she feels she is getting stronger, but still wakes up to pain sometimes.      Objective:  Modalities: PRN  Manual: DI hip flexor, glute med, piriformis,  Hypervolt R TFL and vastus lateralis, rectus femoris  MET pubic correction, Long axis R LE pull, Hip MOB lateral glide, caudal glide   Precautions: standard  Exercises:  Exercise Reps/ Time Weight/ Level Issued for HEP   MOBO Y/N Comments   Prone               Flying squirrels               Hip ext (glut max)               [place] hip ext               Supine               2 legged bridges               1 legged bridges               PB hamstring curls           Hips to remain in neutral to ext   [employer]               Sidelying               Clams 90 deg  2 sets fatigue orange  x x       L[place] hip abd  2 sets fatigue    x x       Quadruped               Donkey kicks           Bar across pelvis   [place] twist               Gym               Fig 4 3x30\"   x x       Supine hip flexor s 2' ea   x x       Strap Quad Stretch 3x30\"   x     Half Kneeling Hip Flexor Stretch 3x30\"   x     Postrure ed x        No crossing legs, no standing weight shifted prolonged    Half Plank Dips x20   x x       TVA bicycle w/ UE static band pull x20 Red cord x x     Hip [place] 2xfatigue lime  x     45 hip abd 2xfatigue orange  x     sidestepping 1L lime  x     Monster walk 1L lime  x     SB stretch 3x30        HS stretch 3x30          Specific Instructions for next treatment:     Treatment Charges: Mins Units   []  Modalities     [x]  Ther Exercise 30 2   [x]  Manual Therapy 30 2   []  Ther Activities     []  Aquatics     []  Vasocompression     []  Other     Total Treatment time 60 4       Assessment: [x] Progressing toward goals. Cont with manual above. Reviewed all previously issued HEP ex. Cues needed to keep stretches within comfortable ROM. Able to add tband to clamshells. Still needs instruction to maintain TA activation to avoid rib flaring as well as to avoid lordosis. [] No change. [] Other:  [x] Patient would continue to benefit from skilled physical therapy services in order to: meet goals listed below     STG: (to be met in 10 treatments)  1. ? Pain:<3/10 when laying down in bed at night  2. ? ROM: Normal hip extension to allow pt to properly activate glutes  3. ? Strength: 5/5 hip strength to allow pt to have normal mechanics with tennis and lifting  4. ? Function: Able to sleep 6-8 hours without walking due to pain in R hip  5. Independent with Home Exercise Programs     LTG: (to be met in 20 treatments)  1. No pain R hip to allow pt to return to working out and playing tennis with less pain post  2.  Pt able to demonstrate normal mechanics with gait and with fwd and lateral lunge  3. LEFI 0% disability       Pt. Education:  [x] Yes  [] No  [x] Reviewed Prior HEP/Ed  Method of Education: [] Verbal  [] Demo  [] Written  Comprehension of Education:  [x] Verbalizes understanding. [x] Demonstrates understanding. [] Needs review. [] Demonstrates/verbalizes HEP/Ed previously given. Plan: [x] Continue current frequency toward long and short term goals.     [] Specific Instructions for subsequent treatments:       Time In: 4:00 pm           Time Out: 5:10 pm    Electronically signed by:  Elba Reeves PTA

## 2021-06-15 ENCOUNTER — HOSPITAL ENCOUNTER (OUTPATIENT)
Dept: PHYSICAL THERAPY | Facility: CLINIC | Age: 61
Setting detail: THERAPIES SERIES
Discharge: HOME | End: 2021-06-15
Payer: COMMERCIAL

## 2021-06-15 PROCEDURE — 97140 MANUAL THERAPY 1/> REGIONS: CPT

## 2021-06-15 PROCEDURE — 97110 THERAPEUTIC EXERCISES: CPT

## 2021-06-15 NOTE — FLOWSHEET NOTE
[] [facility]) - [facility] &  Therapy  [address]  [phone]  F: [phone] [] [address]  [place] [address]   [address]  P: [phone]  F: [phone] [x] [address] &  Therapy  [address]  P: [phone]  F: [phone] [] [address]  P: [phone]  F: [phone] [] [address]  [facility]   [address]   [place]: [phone]  F: [phone]      Physical Therapy Daily Treatment Note    Date:  6/15/2021  Patient Name:  Dioni Lincoln    :  1960  MRN: [id number]  Physician: Dr. Woody Bocanegra: Radha Clarke Diagnosis:   OA R hip, ITB syndrome                     Rehab Codes: M25.551  Onset date:    20                                    Next Dr's appt.: unknown  Visit# / total visits:      Cancels/No Shows:     Subjective:    Pain:  [] Yes  [x] No Location: R hip Pain Rating: (0-10 scale) -/10  Pain altered Tx:  [x] No  [] Yes  Action:  Comments: States sleep has improved. Still occasionally getting muscle cramps down her leg.      Objective:  Modalities: PRN  Manual: DI hip flexor, glute med, piriformis,  Hypervolt R TFL and vastus lateralis, rectus femoris  MET pubic correction, Long axis R LE pull, Hip MOB lateral glide, caudal glide   Precautions: standard  Exercises:  Exercise Reps/ Time Weight/ Level Issued for HEP   MOBO Y/N Comments   Prone               Flying squirrels               Hip ext (glut max)               [place] hip ext               Supine               2 legged bridges               1 legged bridges               PB hamstring curls           Hips to remain in neutral to ext   [employer] 90 deg  2 sets fatigue orange  x        [place] hip abd  2 sets fatigue    x        Quadruped               [employer] across pelvis   Rewarding Return twist               Gym               Fig 4 3x30\"   x x       Supine hip flexor s 2' ea   x x       Strap Quad Stretch 3x30\"   x     Half Kneeling Hip Flexor Stretch 3x30\"        Postrure ed x        No crossing legs, no standing weight shifted prolonged    Half Plank Dips x20   x        TVA bicycle w/ UE static band pull x20 Red cord x      Hip [place] 2xfatigue lime  x     45 hip abd 2xfatigue lime  x     sidestepping 2L lime x x     Monster walk 2L lime x x     SB stretch 3x30        HS stretch 3x30          Specific Instructions for next treatment: Add 3 direction heel tap when pt is able to work on unlocking hip    Treatment Charges: Mins Units   []  Modalities     [x]  Ther Exercise 30 2   [x]  Manual Therapy 20 1   []  Ther Activities     []  Aquatics     []  Vasocompression     []  Other     Total Treatment time 50 3       Assessment: [x] Progressing toward goals. Cont with manual above with increased mobility noted post. Needs cues with monster walks to unlock her hips. Less lordosis demonstrated during hip circles and 45 hip abduction. [] No change. [] Other:  [x] Patient would continue to benefit from skilled physical therapy services in order to: meet goals listed below     STG: (to be met in 10 treatments)  ? Pain:<3/10 when laying down in bed at night  ? ROM: Normal hip extension to allow pt to properly activate glutes  ? Strength: 5/5 hip strength to allow pt to have normal mechanics with tennis and lifting  ? Function: Able to sleep 6-8 hours without walking due to pain in R hip  Independent with Home Exercise Programs     LTG: (to be met in 20 treatments)  No pain R hip to allow pt to return to working out and playing tennis with less pain post  Pt able to demonstrate normal mechanics with gait and with fwd and lateral lunge  LEFI 0% disability       Pt. Education:  [x] Yes  [] No  [x] Reviewed Prior HEP/Ed  Method of Education: [] Verbal  [] Demo  [] Written  Comprehension of Education:  [x] Verbalizes understanding. [x] Demonstrates understanding. [] Needs review. [] Demonstrates/verbalizes HEP/Ed previously given. Plan: [x] Continue current frequency toward long and short term goals.     [] Specific Instructions for subsequent treatments:       Time In: 1:00 pm           Time Out: 1:50 pm    Electronically signed by:  Derrick Rodriguez PTA

## 2021-06-17 ENCOUNTER — HOSPITAL ENCOUNTER (OUTPATIENT)
Dept: PHYSICAL THERAPY | Facility: CLINIC | Age: 61
Setting detail: THERAPIES SERIES
Discharge: HOME | End: 2021-06-17
Payer: COMMERCIAL

## 2021-06-17 PROCEDURE — 97110 THERAPEUTIC EXERCISES: CPT

## 2021-06-17 PROCEDURE — 97140 MANUAL THERAPY 1/> REGIONS: CPT

## 2021-06-17 NOTE — FLOWSHEET NOTE
[] [facility]) - [facility] &  Therapy  [address]  [phone]  F: [phone] [] [address]  [place] [address]   [address]  P: [phone]  F: [phone] [x] [address] &  Therapy  [address] [facility]  P: [phone]  F: [phone] [] [address]  P: [phone]  F: [phone] [] [address]  [facility]   [address]   [place]: [phone]  F: [phone]      Physical Therapy Daily Treatment Note    Date:  2021  Patient Name:  Cyndi Sanches    :  1960  MRN: [id number]  Physician: Dr. Rivera: Birdie Jarvis Diagnosis:   OA R hip, ITB syndrome                     Rehab Codes: M25.551  Onset date:    20                                    Next Dr's appt.: unknown  Visit# / total visits:      Cancels/No Shows:     Subjective:    Pain:  [] Yes  [x] No Location: R hip Pain Rating: (0-10 scale) -/10  Pain altered Tx:  [x] No  [] Yes  Action:  Comments: States she was able to sleep on her R side last night.     Objective:  Modalities: PRN  Manual: DI hip flexor, glute med, piriformis,  Hypervolt R TFL and vastus lateralis, rectus femoris  MET pubic correction, Long axis R LE pull, Hip MOB lateral glide, caudal glide   Precautions: standard  Exercises:  Exercise Reps/ Time Weight/ Level Issued for HEP   MOBO Y/N Comments   Prone               Flying squirrels               Hip ext (glut max)               [place] hip ext               Supine               2 legged bridges               1 legged bridges               PB hamstring curls           Hips to remain in neutral to ext   [employer] 90 deg  2 sets fatigue lime  x x       [place] hip abd  2 sets fatigue    x x       Quadruped               [place] across pelvis   [place] twist               Gym               Fig 4 3x30\"   x x       Supine hip flexor s 2' ea   x x       Strap Quad Stretch 3x30\"   x     Half Kneeling Hip Flexor Stretch 3x30\"        Postrure ed x        No crossing legs, no standing weight shifted prolonged    Half Plank Dips x20   x        TVA bicycle w/ UE static band pull x20 Red cord x      Hip [place] 2xfatigue lime  x     45 hip abd 2xfatigue lime  x     sidestepping 2L lime x x     Monster walk 2L lime x x     SB stretch 3x30        HS stretch 3x30          Specific Instructions for next treatment: Add 3 direction heel tap when pt is able to work on unlocking hip    Treatment Charges: Mins Units   []  Modalities     [x]  Ther Exercise 30 2   [x]  Manual Therapy 25 2   []  Ther Activities     []  Aquatics     []  Vasocompression     []  Other     Total Treatment time 55 4       Assessment: [x] Progressing toward goals. Cont with manual above with increased mobility noted post. Still needs cues to unlock her hips with monster walks as well as to avoid hip rotation. Able to increase resistance for clamshells. [] No change. [] Other:  [x] Patient would continue to benefit from skilled physical therapy services in order to: meet goals listed below     STG: (to be met in 10 treatments)  1. ? Pain:<3/10 when laying down in bed at night  2. ? ROM: Normal hip extension to allow pt to properly activate glutes  3. ? Strength: 5/5 hip strength to allow pt to have normal mechanics with tennis and lifting  4. ? Function: Able to sleep 6-8 hours without walking due to pain in R hip  5. Independent with Home Exercise Programs     LTG: (to be met in 20 treatments)  1. No pain R hip to allow pt to return to working out and playing tennis with less pain post  2. Pt able to demonstrate normal mechanics with gait and with fwd and lateral lunge  3.  LEFI 0% disability

## 2021-06-22 ENCOUNTER — HOSPITAL ENCOUNTER (OUTPATIENT)
Dept: PHYSICAL THERAPY | Facility: CLINIC | Age: 61
Setting detail: THERAPIES SERIES
Discharge: HOME | End: 2021-06-22
Payer: COMMERCIAL

## 2021-06-22 PROCEDURE — 97110 THERAPEUTIC EXERCISES: CPT

## 2021-06-22 PROCEDURE — 97140 MANUAL THERAPY 1/> REGIONS: CPT

## 2021-06-22 NOTE — FLOWSHEET NOTE
[] [facility]) - [facility] &  Therapy  [address]  [phone]  F: [phone] [] [address]  [place] [address]   [address]  P: [phone]  F: [phone] [x] [address] &  Therapy  [address]  P: [phone]  F: [phone] [] [address]  P: [phone]  F: [phone] [] [address]  [facility]   [address]   [place]: [phone]  F: [phone]      Physical Therapy Daily Treatment Note    Date:  2021  Patient Name:  Zahira Echavarria    :  1960  MRN: [id number]  Physician: Dr. Paz Carry: Ena Rivera Diagnosis:   OA R hip, ITB syndrome                     Rehab Codes: M25.551  Onset date:    20                                    Next 's appt.: unknown  Visit# / total visits:      Cancels/No Shows:     Subjective:    Pain:  [] Yes  [x] No Location: R hip Pain Rating: (0-10 scale) -/10  Pain altered Tx:  [x] No  [] Yes  Action:  Comments: Pt reports her pain \"is a different pain\" since starting PT. Less of a throb and more of \"muscle pain. \"    Objective:  Modalities: PRN  Manual: DI hip flexor, glute med, piriformis,  Hypervolt R TFL and vastus lateralis, rectus femoris  MET pubic correction, Long axis R LE pull, Hip MOB lateral glide, caudal glide   Precautions: standard  Exercises:  Exercise Reps/ Time Weight/ Level Issued for HEP   MOBO Y/N Comments   Prone               Flying squirrels               Hip ext (glut max)               [place] hip ext               Supine               2 legged bridges               1 legged bridges               PB hamstring curls           Hips to remain in neutral to ext   [employer] 90 deg  2 sets fatigue lime  x x       Lelo hip abd  2 sets fatigue    x x       Quadruped               [employer] [facility] across pelvis   [place] twist               Gym               Fig 4 3x30\"   x x       Supine hip flexor s 2' ea   x x       Strap Quad Stretch 3x30\"        Half Kneeling Hip Flexor Stretch 3x30\"        Postrure ed x        No crossing legs, no standing weight shifted prolonged    Half Plank Dips x20   x        TVA bicycle w/ UE static band pull x20 Red cord x      Hip [place] 2xfatigue lime  x     45 hip abd 2xfatigue lime  x     sidestepping 2L lime x x     Monster walk 2L lime x x     SB stretch 3x30        HS stretch 3x30        3 direction heel tap 2x10 2\"  x       Specific Instructions for next treatment:     Treatment Charges: Mins Units   []  Modalities     [x]  Ther Exercise 30 2   [x]  Manual Therapy 25 2   []  Ther Activities     []  Aquatics     []  Vasocompression     []  Other     Total Treatment time 55 4       Assessment: [x] Progressing toward goals. Cont with manual above, overall less tender at TFL. More TTP surrounding ischial tuberosity. Added 3 way heel tap, max cues to unlock hips as well as avoid rotation during reverse tap. Encouraged completion of daily HEP as well as being mindful of technique when working out at [email or web address]. [] No change. [] Other:  [x] Patient would continue to benefit from skilled physical therapy services in order to: meet goals listed below     STG: (to be met in 10 treatments)  1. ? Pain:<3/10 when laying down in bed at night  2. ? ROM: Normal hip extension to allow pt to properly activate glutes  3. ? Strength: 5/5 hip strength to allow pt to have normal mechanics with tennis and lifting  4. ? Function: Able to sleep 6-8 hours without walking due to pain in R hip  5. Independent with Home Exercise Programs     LTG: (to be met in 20 treatments)  1.  No pain R hip to allow pt to return to working out and playing tennis with less pain

## 2021-06-24 ENCOUNTER — HOSPITAL ENCOUNTER (OUTPATIENT)
Dept: PHYSICAL THERAPY | Facility: CLINIC | Age: 61
Setting detail: THERAPIES SERIES
Discharge: HOME | End: 2021-06-24
Payer: COMMERCIAL

## 2021-06-24 PROCEDURE — 97140 MANUAL THERAPY 1/> REGIONS: CPT

## 2021-06-24 PROCEDURE — 97110 THERAPEUTIC EXERCISES: CPT

## 2021-06-24 NOTE — FLOWSHEET NOTE
[] [facility]) - [facility] &  Therapy  [address]  [phone]  F: [phone] [] [address]  [employer] [address]   [address]  P: [phone]  F: [phone] [x] [address] &  Therapy  [address]  P: [phone]  F: [phone] [] [address]  P: [phone]  F: [phone] [] [address]  [facility]   [address]   [place]: [phone]  F: [phone]      Physical Therapy Daily Treatment Note    Date:  2021  Patient Name:  She Waldrop    :  1960  MRN: [id number]  Physician: Dr. Simona Dawn: [facility]  Medical Diagnosis:   OA R hip, ITB syndrome                     Rehab Codes: M25.551  Onset date:    20                                    Next Dr's appt.: unknown  Visit# / total visits:      Cancels/No Shows:     Subjective:    Pain:  [] Yes  [x] No Location: R hip Pain Rating: (0-10 scale) -/10  Pain altered Tx:  [x] No  [] Yes  Action:  Comments: Pt reports the place she is having pain with sleeping is different.  More at side of leg and not in buttock    Objective:  Modalities: PRN  Manual: DI hip flexor, glute med, piriformis,  Hypervolt R TFL and vastus lateralis, rectus femoris Long axis R LE pull, Hip MOB lateral glide, caudal glide   Precautions: standard  Exercises:  Exercise Reps/ Time Weight/ Level Issued for HEP   MOBO Y/N Comments   Prone               Flying squirrels  2x10   x  x       Hip ext (glut max)               [place] hip ext               Supine               2 legged bridges               1 legged bridges               PB hamstring curls           Hips to remain in neutral to ext   [employer] 90 deg  2 sets fatigue lime  x        1/2 side plank hip abd  2 sets fatigue    x x       Quadruped               [employer] across pelvis   [place] twist               Gym               Fig 4 3x30\"   x x       Supine hip flexor s 2' ea   x x       Strap Quad Stretch 3x30\"        Half Kneeling Hip Flexor Stretch 3x30\"        Postrure ed x        No crossing legs, no standing weight shifted prolonged    Half Plank Dips x20   x        TVA bicycle w/ UE static band pull x20 Red cord x      Hip [place] 2xfatigue lime       45 hip abd 2xfatigue lime       sidestepping 2L lime x x     Monster walk 2L lime x x     SB stretch 3x30   x     HS stretch 3x30        3 direction heel tap 2x10 4\"  x  Foam roller at knee   Powerstride x15 12\"  x     Retro Lunge 2x10   x       Specific Instructions for next treatment:     Treatment Charges: Mins Units   []  Modalities     [x]  Ther Exercise 30 2   [x]  Manual Therapy 25 2   []  Ther Activities     []  Aquatics     []  Vasocompression     []  Other     Total Treatment time 55 4       Assessment: [x] Progressing toward goals. Pt is still lacking strength on the R vs L hip especially glute max and glute med. ROM in ER, and IR R hip improved. Pt is poor at hinging her hip to utilize glute and wants to stay quad dominant. This tendency is leading to vlagus collpase at the knee as well as hip adduction R> L. Need to continue to strengthen, glutes, quad and abdominals     [] Other:  [x] Patient would continue to benefit from skilled physical therapy services in order to: meet goals listed below     STG: (to be met in 10 treatments)  1. ? Pain:<3/10 when laying down in bed at night  2. ? ROM: Normal hip extension to allow pt to properly activate glutes  3. ? Strength: 5/5 hip strength to allow pt to have normal mechanics with tennis and lifting  4. ? Function: Able to sleep 6-8 hours without walking due to pain in R hip  5.  Independent with Home Exercise Programs     LTG: (to be met in 20 treatments)  1. No pain R hip to allow pt to return to working out and playing tennis with less pain post  2. Pt able to demonstrate normal mechanics with gait and with fwd and lateral lunge  3. LEFI 0% disability       Pt. Education:  [x] Yes  [] No  [x] Reviewed Prior HEP/Ed  Method of Education: [] Verbal  [] Demo  [] Written  Comprehension of Education:  [x] Verbalizes understanding. [x] Demonstrates understanding. [] Needs review. [] Demonstrates/verbalizes HEP/Ed previously given. Plan: [x] Continue current frequency toward long and short term goals.     [] Specific Instructions for subsequent treatments:       Time In: 5727           Time Out: 3012    Electronically signed by:  Deshawn Douglas PT

## 2021-06-29 ENCOUNTER — HOSPITAL ENCOUNTER (OUTPATIENT)
Dept: PHYSICAL THERAPY | Facility: CLINIC | Age: 61
Setting detail: THERAPIES SERIES
Discharge: HOME | End: 2021-06-29
Payer: COMMERCIAL

## 2021-06-29 PROCEDURE — 97140 MANUAL THERAPY 1/> REGIONS: CPT

## 2021-06-29 PROCEDURE — 97110 THERAPEUTIC EXERCISES: CPT

## 2021-06-29 NOTE — FLOWSHEET NOTE
[] [facility]) - [facility] &  Therapy  [address]  [phone]  F: [phone] [] [address]  [address]   [address]  P: [phone]  F: [phone] [x] [address] &  Therapy  [address]  P: [phone]  F: [phone] [] [address]  P: [phone]  F: [phone] [] [address]  [facility]   [address]   [place]: [phone]  F: [phone]      Physical Therapy Daily Treatment Note    Date:  2021  Patient Name:  Maribel Gomez    :  1960  MRN: [id number]  Physician: Dr. Kingsley Loomis: [facility]  Medical Diagnosis:   OA R hip, ITB syndrome                     Rehab Codes: M25.551  Onset date:    20                                    Next Dr's appt.: unknown  Visit# / total visits: 10/20     Cancels/No Shows:     Subjective:    Pain:  [] Yes  [x] No Location: R hip Pain Rating: (0-10 scale) -/10  Pain altered Tx:  [x] No  [] Yes  Action:  Comments: Pt  Reports she is doing better. Less pain overall but still a little at night.     Objective:  Modalities: PRN  Manual: DI hip flexor, glute med, piriformis,  Hypervolt R TFL and vastus lateralis, rectus femoris Long axis R LE pull, Hip MOB lateral glide, caudal glide   Precautions: standard  Exercises:  Exercise Reps/ Time Weight/ Level Issued for HEP   MOBO Y/N Comments   Prone               Flying squirrels  2x10   x         Hip ext (glut max)               [place] hip ext               Supine               2 legged bridges               1 legged bridges               PB hamstring curls           Hips to remain in neutral to ext   [employer] 90 deg  2 sets fatigue lime  x        1/2 side plank hip abd  2 sets fatigue    x        Quadruped               [employer] across pelvis   [employer] twist               Gym               Fig 4 3x30\"   x x       Supine hip flexor s 2' ea   x x       Strap Quad Stretch 3x30\"   x     Primal plank 3x20\"   x     Postrure ed x        No crossing legs, no standing weight shifted prolonged    Half Plank Dips x20   x        TVA bicycle w/ UE static band pull x20 Red cord x      Hip [place] 2xfatigue lime       45 hip abd 2xfatigue lime       sidestepping 2L blue x x     Monster walk 2L blue x x     SB stretch 3x30   x     HS stretch 3x30        3 direction heel tap 2x10 4\"  x  Foam roller at knee   Powerstride x15 12\"       Retro Lunge 2x10   x     Isotonic split squat w/twist x10 6#  x       Specific Instructions for next treatment:     Treatment Charges: Mins Units   []  Modalities     [x]  Ther Exercise 30 2   [x]  Manual Therapy 25 2   []  Ther Activities     []  Aquatics     []  Vasocompression     []  Other     Total Treatment time 55 4       Assessment: [x] Progressing toward goals. Pt remains tight in R hip ER at 45 deg flexion of hip but is normal now at 90 deg hip flexion. IR is not painful and improves to normal with hip mobilization. Pt still has a difficult time engaging glute and performs single leg exercises demonstrating contralateral hip drop and tries to cheat using quad. Need to continue to strengthen core and hips. [] Other:  [x] Patient would continue to benefit from skilled physical therapy services in order to: meet goals listed below     STG: (to be met in 10 treatments)  1. ? Pain:<3/10 when laying down in bed at night  2. ? ROM: Normal hip extension to allow pt to properly activate glutes  3. ? Strength: 5/5 hip strength to allow pt to have normal mechanics with tennis and lifting  4. ? Function: Able to sleep 6-8 hours without walking due to pain in R hip  5.  Independent with Home Exercise Programs     LTG: (to be met in 20 treatments)  1. No pain R hip to allow pt to return to working out and playing tennis with less pain post  2. Pt able to demonstrate normal mechanics with gait and with fwd and lateral lunge  3. LEFI 0% disability       Pt. Education:  [x] Yes  [] No  [x] Reviewed Prior HEP/Ed  Method of Education: [] Verbal  [] Demo  [] Written  Comprehension of Education:  [x] Verbalizes understanding. [x] Demonstrates understanding. [] Needs review. [] Demonstrates/verbalizes HEP/Ed previously given. Plan: [x] Continue current frequency toward long and short term goals.     [] Specific Instructions for subsequent treatments:       Time In: 3254           Time Out: 7759    Electronically signed by:  Mily Overall, PT

## 2021-07-01 ENCOUNTER — HOSPITAL ENCOUNTER (OUTPATIENT)
Dept: PHYSICAL THERAPY | Facility: CLINIC | Age: 61
Setting detail: THERAPIES SERIES
Discharge: HOME | End: 2021-07-01
Payer: COMMERCIAL

## 2021-07-01 PROCEDURE — 97140 MANUAL THERAPY 1/> REGIONS: CPT

## 2021-07-01 PROCEDURE — 97110 THERAPEUTIC EXERCISES: CPT

## 2021-07-01 NOTE — FLOWSHEET NOTE
Kehinde Paulino kicks           Bar across pelvis   [employer] twist               Gym               Fig 4 3x30\"   x x       Supine hip flexor s 2' ea   x        Strap Quad Stretch 3x30\"   x     Primal plank 3x20\"   x     Postrure ed x        No crossing legs, no standing weight shifted prolonged    Half Plank Dips x20   x        TVA bicycle w/ UE static band pull x20 Red cord x      Hip [place] 2xfatigue lime       45 hip abd 2xfatigue lime       sidestepping 2L blue x x     Monster walk 2L blue x x     SB stretch 3x30   x     HS stretch 3x30        3 direction heel tap 2x10 4\"  x  Foam roller at knee   Powerstride x15 12\"       Retro Lunge 2x10   x     Isotonic split squat w/twist x10 6#  x       Specific Instructions for next treatment:     Treatment Charges: Mins Units   []  Modalities     [x]  Ther Exercise 30 2   [x]  Manual Therapy 25 2   []  Ther Activities     []  Aquatics     []  Vasocompression     []  Other     Total Treatment time 55 4       Assessment: [x] Progressing toward goals. Pt remains tight in R hip ER at 45 deg flexion of hip but is normal now at 90 deg hip flexion. IR is not painful and improves to normal with hip mobilization. Pt still has a difficult time engaging glute and performs single leg exercises demonstrating contralateral hip drop and tries to cheat using quad. Need to continue to strengthen core and hips. [] Other:  [x] Patient would continue to benefit from skilled physical therapy services in order to: meet goals listed below     STG: (to be met in 10 treatments)  1. ? Pain:<3/10 when laying down in bed at night  2. ? ROM: Normal hip extension to allow pt to properly activate glutes  3. ? Strength: 5/5 hip strength to allow pt to have normal mechanics with tennis and lifting  4. ? Function: Able to sleep 6-8 hours without walking due to pain in R hip  5. Independent with Home Exercise Programs     LTG: (to be met in 20 treatments)  1.  No pain R hip to allow pt

## 2021-07-08 ENCOUNTER — HOSPITAL ENCOUNTER (OUTPATIENT)
Dept: PHYSICAL THERAPY | Facility: CLINIC | Age: 61
Setting detail: THERAPIES SERIES
Discharge: HOME | End: 2021-07-08
Payer: COMMERCIAL

## 2021-07-08 PROCEDURE — 97140 MANUAL THERAPY 1/> REGIONS: CPT

## 2021-07-08 PROCEDURE — 97110 THERAPEUTIC EXERCISES: CPT

## 2021-07-08 NOTE — FLOWSHEET NOTE
[] [facility]) - [facility] &  Therapy  [address]  [phone]  F: [phone] [] [address]  [employer] [address]   [address]  P: [phone]  F: [phone] [x] [address] &  Therapy  [address]  P: [phone]  F: [phone] [] [address]  P: [phone]  F: [phone] [] [address]  [facility]   [address]   [place]: [phone]  F: [phone]      Physical Therapy Daily Treatment Note    Date:  2021  Patient Name:  Jackson oRdarte    :  1960  MRN: [id number]  [facility] Leonard Babinski Diagnosis:   OA R hip, ITB syndrome                     Rehab Codes: M25.551  Onset date:    20                                    Next Dr's appt.: unknown  Visit# / total visits:      Cancels/No Shows:     Subjective:    Pain:  [] Yes  [x] No Location: R hip Pain Rating: (0-10 scale) -/10  Pain altered Tx:  [x] No  [] Yes  Action:  Comments: Pt  Reports she is doing better. Less pain overall but still a little at night.     Objective:  Modalities: PRN  Manual: DI hip flexor, glute med, piriformis,  Hypervolt R TFL and vastus lateralis, rectus femoris Long axis R LE pull, Hip MOB lateral glide, caudal glide   Precautions: standard  Exercises:  Exercise Reps/ Time Weight/ Level Issued for HEP   MOBO Y/N Comments   Prone               Flying squirrels  2x10   x         Hip ext (glut max)               [place] hip ext               Supine               2 legged bridges               1 legged bridges               PB hamstring curls           Hips to remain in neutral to ext   [employer] 90 deg  2 sets fatigue lime  x        1/2 lateral lunge  3. LEFI 0% disability       Pt. Education:  [x] Yes  [] No  [x] Reviewed Prior HEP/Ed  Method of Education: [] Verbal  [] Demo  [] Written  Comprehension of Education:  [x] Verbalizes understanding. [x] Demonstrates understanding. [] Needs review. [] Demonstrates/verbalizes HEP/Ed previously given. Plan: [x] Continue current frequency toward long and short term goals.     [] Specific Instructions for subsequent treatments:       Time In: 4:02 pm           Time Out: 5:03 pm    Electronically signed by:  Bert Springer PTA

## 2021-07-13 ENCOUNTER — HOSPITAL ENCOUNTER (OUTPATIENT)
Dept: PHYSICAL THERAPY | Facility: CLINIC | Age: 61
Setting detail: THERAPIES SERIES
Discharge: HOME | End: 2021-07-13
Payer: COMMERCIAL

## 2021-07-13 PROCEDURE — 97140 MANUAL THERAPY 1/> REGIONS: CPT

## 2021-07-13 PROCEDURE — 97110 THERAPEUTIC EXERCISES: CPT

## 2021-07-13 NOTE — FLOWSHEET NOTE
[] [facility]) - [facility] &  Therapy  [address]  [phone]  F: [phone] [] [address]  [address]   [address]  P: [phone]  F: [phone] [x] [address] &  Therapy  [address]  P: [phone]  F: [phone] [] [address]  P: [phone]  F: [phone] [] [address]  [facility]   [address]   [place]: [phone]  F: [phone]      Physical Therapy Daily Treatment Note    Date:  2021  Patient Name:  Tiffany Gallagher    :  1960  MRN: [id number]  Physician: Dr. Luma Stiles: [address] Diagnosis:   OA R hip, ITB syndrome                     Rehab Codes: M25.551  Onset date:    20                                    Next Dr's appt.: unknown  Visit# / total visits:      Cancels/No Shows:     Subjective:    Pain:  [] Yes  [x] No Location: R hip Pain Rating: (0-10 scale) -/10  Pain altered Tx:  [x] No  [] Yes  Action:  Comments: Pt reports foam rolling at night has improved her pain with sleeping.      Objective:  Modalities: PRN  Manual: DI hip flexor, glute med, piriformis,  Hypervolt R TFL and vastus lateralis, rectus femoris Long axis R LE pull, Hip MOB lateral glide, caudal glide   Precautions: standard  Exercises:  Exercise Reps/ Time Weight/ Level Issued for HEP   MOBO Y/N Comments   Prone               Flying squirrels  2x10   x         Hip ext (glut max)               [place] hip ext               Supine               2 legged bridges               1 legged bridges               PB hamstring curls           Hips to remain in neutral to ext   [employer] 90 deg  2 sets fatigue lime  x        1/2 side plank hip abd  2 sets fatigue    x x       Quadruped               [employer] across pelvis   [place] twist               Gym               Fig 4 3x30\"   x x       Supine hip flexor s 2' ea   x x       Strap Quad Stretch 3x30\"   x     Primal plank 3x20\"   x     Postrure ed x        No crossing legs, no standing weight shifted prolonged    Half Plank Dips x20   x        TVA bicycle w/ UE static band pull x20 Red cord x      Hip [place] 2xfatigue lime       45 hip abd 2xfatigue lime       sidestepping 2L blue x x     Monster walk 2L blue x x     SB stretch 3x30   x     HS stretch 3x30        3 direction heel tap 2x10 4\"  x  Foam roller at knee   Powerstride x15 12\"       Retro Lunge 2x10   x     Isotonic split squat w/twist x10 6#         Specific Instructions for next treatment:     Treatment Charges: Mins Units   []  Modalities     [x]  Ther Exercise 30 2   [x]  Manual Therapy 25 2   []  Ther Activities     []  Aquatics     []  Vasocompression     []  Other     Total Treatment time 55 4       Assessment: [x] Progressing toward goals. Cont with manual with improved mobility noted post. Less cues needed to unlock her hip but technique suffers as fatigue sets in. Reviewed technique of 1/2 plank with SL hip abduction and prone flying squirrel.      [] Other:  [x] Patient would continue to benefit from skilled physical therapy services in order to: meet goals listed below     STG: (to be met in 10 treatments)  1. ? Pain:<3/10 when laying down in bed at night  2. ? ROM: Normal hip extension to allow pt to properly activate glutes  3. ? Strength: 5/5 hip strength to allow pt to have normal mechanics with tennis and lifting  4. ? Function: Able to sleep 6-8 hours without walking due to pain in R hip  5. Independent with Home Exercise Programs     LTG: (to be met in 20 treatments)  1. No pain R hip to allow pt to return to working out and playing tennis with less pain post  2.  Pt able to demonstrate normal mechanics

## 2021-07-15 ENCOUNTER — HOSPITAL ENCOUNTER (OUTPATIENT)
Dept: PHYSICAL THERAPY | Facility: CLINIC | Age: 61
Setting detail: THERAPIES SERIES
Discharge: HOME | End: 2021-07-15
Payer: COMMERCIAL

## 2021-07-15 PROCEDURE — 97110 THERAPEUTIC EXERCISES: CPT

## 2021-07-15 PROCEDURE — 97140 MANUAL THERAPY 1/> REGIONS: CPT

## 2021-07-15 NOTE — FLOWSHEET NOTE
[] [facility]) - [facility] &  Therapy  [address]  [phone]  F: [phone] [] [address]  [address]   [address]  P: [phone]  F: [phone] [x] [address] &  Therapy  [address]  P: [phone]  F: [phone] [] [address]  P: [phone]  F: [phone] [] [address]  [facility]   [address]   [place]: [phone]  F: [phone]      Physical Therapy Daily Treatment Note    Date:  7/15/2021  Patient Name:  Leo Coronado    :  1960  MRN: [id number]  Physician: Dr. Socorro Quintana: Zachariah Purcell Diagnosis:   OA R hip, ITB syndrome                     Rehab Codes: M25.551  Onset date:    20                                    Next Dr's appt.: unknown  Visit# / total visits:      Cancels/No Shows:     Subjective:    Pain:  [] Yes  [x] No Location: R hip Pain Rating: (0-10 scale) -/10  Pain altered Tx:  [x] No  [] Yes  Action:  Comments: States sleep is improved. Has been foam rolling before bed. Feels like she is tight in her quad today.      Objective:  Modalities: PRN  Manual: DI hip flexor, glute med, piriformis,  Hypervolt R TFL and vastus lateralis, rectus femoris Long axis R LE pull, Hip MOB lateral glide, caudal glide   Precautions: standard  Exercises:  Exercise Reps/ Time Weight/ Level Issued for HEP   MOBO Y/N Comments   Prone               Flying squirrels  2x10   x         Hip ext (glut max)               [place] hip ext               Supine               2 legged bridges               1 legged bridges               PB hamstring curls           Hips to remain in neutral to ext   [employer] 90 deg  2 sets fatigue lime  x        1/2 side plank hip abd  2 sets fatigue    x x       Quadruped               [employer] across pelvis   [employer] twist               Gym               Fig 4 3x30\"   x x       Supine hip flexor s 2' ea   x x       Strap Quad Stretch 3x30\"   x     Primal plank 3x20\"   x     Postrure ed x        No crossing legs, no standing weight shifted prolonged    Half Plank Dips x20   x        TVA bicycle w/ UE static band pull x20 Red cord x      Hip [place] 2xfatigue lime       45 hip abd 2xfatigue lime       sidestepping 2L blue x x     Monster walk 2L blue x x     SB stretch 3x30   x     HS stretch 3x30        3 direction heel tap 2x10 4\"  x  Foam roller at knee   Powerstride x15 12\"       Retro Lunge 2x10   x     Modified RDL x10 9# kb  x     Isotonic split squat w/twist x10 6#                  Specific Instructions for next treatment:     Treatment Charges: Mins Units   []  Modalities     [x]  Ther Exercise 30 2   [x]  Manual Therapy 25 2   []  Ther Activities     []  Aquatics     []  Vasocompression     []  Other     Total Treatment time 55 4       Assessment: [x] Progressing toward goals. Cont with manual with improved mobility noted post. Moderate tension noted in hip flexor, less along TFL. Able to remove foam roller from heel taps with pt demonstrating proper technique without need of tactile cue. Added modified RDL's with good tolerance, cues to engage glut. [] Other:  [x] Patient would continue to benefit from skilled physical therapy services in order to: meet goals listed below     STG: (to be met in 10 treatments)  1. ? Pain:<3/10 when laying down in bed at night  2. ? ROM: Normal hip extension to allow pt to properly activate glutes  3. ? Strength: 5/5 hip strength to allow pt to have normal mechanics with tennis and lifting  4. ? Function: Able to sleep 6-8 hours without walking due to pain in R hip  5.  Independent with Home Exercise Programs     LTG: (to be met in 20 treatments)  1. No pain R hip to allow pt to return to working out and playing tennis with less pain post  2. Pt able to demonstrate normal mechanics with gait and with fwd and lateral lunge  3. LEFI 0% disability       Pt. Education:  [x] Yes  [] No  [x] Reviewed Prior HEP/Ed  Method of Education: [] Verbal  [] Demo  [] Written  Comprehension of Education:  [x] Verbalizes understanding. [x] Demonstrates understanding. [] Needs review. [] Demonstrates/verbalizes HEP/Ed previously given. Plan: [x] Continue current frequency toward long and short term goals.     [] Specific Instructions for subsequent treatments:       Time In: 1:00 pm           Time Out: 2:00 pm    Electronically signed by:  Adam Reyna PTA

## 2021-07-22 ENCOUNTER — HOSPITAL ENCOUNTER (OUTPATIENT)
Dept: PHYSICAL THERAPY | Facility: CLINIC | Age: 61
Setting detail: THERAPIES SERIES
Discharge: HOME | End: 2021-07-22
Payer: COMMERCIAL

## 2021-07-22 PROCEDURE — 97140 MANUAL THERAPY 1/> REGIONS: CPT

## 2021-07-22 PROCEDURE — 97110 THERAPEUTIC EXERCISES: CPT

## 2021-07-22 NOTE — FLOWSHEET NOTE
[] [facility]) - [facility] &  Therapy  [address]  [phone]  F: [phone] [] [address]  [employer] [address]   [address]  P: [phone]  F: [phone] [x] [address] &  Therapy  [address]  P: [phone]  F: [phone] [] [address]  P: [phone]  F: [phone] [] [address]  [facility]   [address]   [place]: [phone]  F: [phone]      Physical Therapy Daily Treatment Note    Date:  2021  Patient Name:  Janet Cardona    :  1960  MRN: [id number]  Physician: Dr. Reinaldo Canchola: [facility]  Medical Diagnosis:   OA R hip, ITB syndrome                     Rehab Codes: M25.551  Onset date:    20                                    Next Dr's appt.: unknown  Visit# / total visits: 15/20     Cancels/No Shows:     Subjective:    Pain:  [] Yes  [x] No Location: R hip Pain Rating: (0-10 scale) -/10  Pain altered Tx:  [x] No  [] Yes  Action:  Comments: States sleep is improved. Has been foam rolling before bed. Feels like she is tight in her quad today.      Objective:  Modalities: PRN  Manual: DI  glute med, piriformis,  Hypervolt R TFL and vastus lateralis, rectus femoris Long axis R LE pull, Hip MOB lateral glide, distraction , anterior glide  Precautions: standard  Exercises:  Exercise Reps/ Time Weight/ Level Issued for HEP   MOBO Y/N Comments   Prone               Flying squirrels  2x10   x         Hip ext (glut max)               [place] hip ext               Supine               2 legged bridges               1 legged bridges               PB hamstring curls           Hips to remain in neutral to ext   [employer] 90 deg  2 sets fatigue lime  x        1/2 side plank hip abd  2 sets fatigue    x        Quadruped               [employer]M [employer] across pelvis   [employer] twist               Gym               Fig 4 3x30\"   x        Supine hip flexor s 2' ea   x        Strap Quad Stretch 3x30\"   x     Primal plank 3x20\"   x     Postrure ed x        No crossing legs, no standing weight shifted prolonged    Half Plank Dips x20   x        TVA bicycle w/ UE static band pull x20 Red cord x      Hip [place] 2xfatigue lime       45 hip abd 2xfatigue lime       sidestepping 2L purple x x     Monster walk 2L purple x x     Lax ball foot self mob x15  x x     Foam roller at wall hip hike 2 sets  x x     SB stretch 3x30        HS stretch 3x30        3 direction heel tap 2x10 4\"  x  Foam roller at knee   Powerstride x15 12\"       Retro Lunge 2x15   x     Modified RDL x10 9# kb       Isotonic split squat  2x15   x                Specific Instructions for next treatment:     Treatment Charges: Mins Units   []  Modalities     [x]  Ther Exercise 25 2   [x]  Manual Therapy 30 2   []  Ther Activities     []  Aquatics     []  Vasocompression     []  Other     Total Treatment time 55 4       Assessment: [x] Progressing toward goals. Pt came in to appointment very tight this date. Lacking hip extension and ER. Improved post mobilization. Pt is loading lateral clumn of R foot throughout any standing exercises. Had pt mobilize her foot with lax ball. She was able to get the foot flat which allowed her to decrease the amount of hip adduction she was doing in SLS. Encourage her to work on loosening foot at home with lax ball to allow improved LE mechanics. She is also having a difficult time activating TVA especially wiht foam roller Hip abd. This allows her to preferentially use hamstring, quad and TFL.       [] Other:  [x] Patient would continue to benefit from skilled physical therapy services in order to: meet goals listed below     STG: (to be met in 10

## 2021-07-27 ENCOUNTER — APPOINTMENT (OUTPATIENT)
Dept: PHYSICAL THERAPY | Facility: CLINIC | Age: 61
End: 2021-07-27
Payer: COMMERCIAL

## 2021-07-29 ENCOUNTER — OFFICE VISIT (OUTPATIENT)
Dept: INTERNAL MEDICINE CLINIC | Age: 61
End: 2021-07-29

## 2021-07-29 ENCOUNTER — HOSPITAL ENCOUNTER (OUTPATIENT)
Dept: PHYSICAL THERAPY | Facility: CLINIC | Age: 61
Setting detail: THERAPIES SERIES
Discharge: HOME | End: 2021-07-29
Payer: COMMERCIAL

## 2021-07-29 VITALS
RESPIRATION RATE: 16 BRPM | HEART RATE: 76 BPM | DIASTOLIC BLOOD PRESSURE: 60 MMHG | WEIGHT: 147 LBS | SYSTOLIC BLOOD PRESSURE: 110 MMHG | HEIGHT: 62 IN | BODY MASS INDEX: 27.05 KG/M2 | TEMPERATURE: 97.7 F

## 2021-07-29 DIAGNOSIS — E03.9 ACQUIRED HYPOTHYROIDISM: ICD-10-CM

## 2021-07-29 DIAGNOSIS — M25.551 RIGHT HIP PAIN: Primary | ICD-10-CM

## 2021-07-29 DIAGNOSIS — Z91.09 ENVIRONMENTAL ALLERGIES: ICD-10-CM

## 2021-07-29 PROCEDURE — 97110 THERAPEUTIC EXERCISES: CPT

## 2021-07-29 PROCEDURE — 97140 MANUAL THERAPY 1/> REGIONS: CPT

## 2021-07-29 PROCEDURE — 99214 OFFICE O/P EST MOD 30 MIN: CPT | Performed by: INTERNAL MEDICINE

## 2021-07-29 RX ORDER — TIZANIDINE 4 MG/1
4 TABLET ORAL 2 TIMES DAILY
Qty: 20 TABLET | Refills: 0 | Status: SHIPPED | OUTPATIENT
Start: 2021-07-29 | End: 2022-03-11 | Stop reason: ALTCHOICE

## 2021-07-29 SDOH — ECONOMIC STABILITY: FOOD INSECURITY: WITHIN THE PAST 12 MONTHS, YOU WORRIED THAT YOUR FOOD WOULD RUN OUT BEFORE YOU GOT MONEY TO BUY MORE.: NEVER TRUE

## 2021-07-29 SDOH — ECONOMIC STABILITY: FOOD INSECURITY: WITHIN THE PAST 12 MONTHS, THE FOOD YOU BOUGHT JUST DIDN'T LAST AND YOU DIDN'T HAVE MONEY TO GET MORE.: NEVER TRUE

## 2021-07-29 ASSESSMENT — SOCIAL DETERMINANTS OF HEALTH (SDOH): HOW HARD IS IT FOR YOU TO PAY FOR THE VERY BASICS LIKE FOOD, HOUSING, MEDICAL CARE, AND HEATING?: NOT HARD AT ALL

## 2021-07-29 NOTE — FLOWSHEET NOTE
[] [facility]) - [facility] &  Therapy  [address]  [phone]  F: [phone] [] [address]  [address]   [address]  P: [phone]  F: [phone] [x] [address] &  Therapy  [address]  P: [phone]  F: [phone] [] [address]  P: [phone]  F: [phone] [] [address]  [facility]   [address]   [place]: [phone]  F: [phone]      Physical Therapy Daily Treatment Note    Date:  2021  Patient Name:  Marbin Cuellar    :  1960  MRN: [id number]  Physician: Dr. Silvia Monet: [facility]  Medical Diagnosis:   OA R hip, ITB syndrome                     Rehab Codes: M25.551  Onset date:    20                                    Next Dr's appt.: unknown  Visit# / total visits:      Cancels/No Shows:     Subjective:    Pain:  [] Yes  [x] No Location: R hip Pain Rating: (0-10 scale) -/10  Pain altered Tx:  [x] No  [] Yes  Action:  Comments: Feels sleep is improved, still occasionally waking up. Has been practicing toe yoga since last visit.      Objective:  Modalities: PRN  Manual: DI  glute med, piriformis,  Hypervolt R TFL and vastus lateralis, rectus femoris Long axis R LE pull, Hip MOB lateral glide, distraction , anterior glide  Precautions: standard  Exercises:  Exercise Reps/ Time Weight/ Level Issued for HEP   MOBO Y/N Comments   Prone               Flying squirrels  2x10   x         Hip ext (glut max)               [place] hip ext               Supine               2 legged bridges               1 legged bridges               PB hamstring curls           Hips to remain in neutral to ext   [employer] 90 deg  2 sets fatigue lime  x        1/2 side plank hip abd  2 sets fatigue    x x       Quadruped               [employer] across pelvis   [employer] twist               Gym               Fig 4 3x30\"   x        Supine hip flexor s 2' ea   x x       Strap Quad Stretch 3x30\"   x     Primal plank 3x20\"   x     Postrure ed x        No crossing legs, no standing weight shifted prolonged    Half Plank Dips x20   x        TVA bicycle w/ UE static band pull x20 Red cord x      Hip [place] 2xfatigue lime       45 hip abd 2xfatigue lime       sidestepping 2L purple x x     Monster walk 2L purple x x     Lax ball foot self mob x15  x x     Foam roller at wall hip hike 2 sets  x      SB stretch 3x30        HS stretch 3x30        3 direction heel tap 2x10 4\"  x  Foam roller at knee   Powerstride x15 12\"       Retro Lunge 2x15   x     Modified RDL x10 9# kb       Isotonic split squat  2x15                   Specific Instructions for next treatment:     Treatment Charges: Mins Units   []  Modalities     [x]  Ther Exercise 25 2   [x]  Manual Therapy 30 2   []  Ther Activities     []  Aquatics     []  Vasocompression     []  Other     Total Treatment time 55 4       Assessment: [x] Progressing toward goals. Still has tension surrounding her hip however less TTP. Good mobility noted post belted hip mobs. Utilized lacrosse ball prior to SLS ex, pt reports less tender compared to last visit. Needs cues to avoid hip drop especially once fatigue sets in.       [] Other:  [x] Patient would continue to benefit from skilled physical therapy services in order to: meet goals listed below     STG: (to be met in 10 treatments)  1. ? Pain:<3/10 when laying down in bed at night  2. ? ROM: Normal hip extension to allow pt to properly activate glutes  3. ? Strength: 5/5 hip strength to allow pt to have normal mechanics with tennis and lifting  4. ? Function: Able to sleep 6-8 hours without walking due to pain in R hip  5.  Independent with Home Exercise Programs     LTG: (to be met in 20 treatments)  1. No pain R hip to allow pt to return to working out and playing tennis with less pain post  2. Pt able to demonstrate normal mechanics with gait and with fwd and lateral lunge  3. LEFI 0% disability       Pt. Education:  [x] Yes  [] No  [x] Reviewed Prior HEP/Ed  Method of Education: [] Verbal  [] Demo  [] Written  Comprehension of Education:  [x] Verbalizes understanding. [x] Demonstrates understanding. [] Needs review. [] Demonstrates/verbalizes HEP/Ed previously given. Plan: [x] Continue current frequency toward long and short term goals.     [] Specific Instructions for subsequent treatments:       Time In: 4:10 pm           Time Out: 5:05 pm    Electronically signed by:  Farrah Springer PTA

## 2021-07-29 NOTE — PROGRESS NOTES
Marcie Valle is a 62 y.o. female who presents for   Chief Complaint   Patient presents with    Hip Pain     right hip pain, going to PT, seems to be helping somewhat. Therapist suggested she get cortisone injection, wonders if a muscle relaxer would help    and follow up of chronic medical problems. Patient Active Problem List   Diagnosis    MVP (mitral valve prolapse)    Lupus (HCC)    Irregular menses     HPI  Here for follow-up on allergies and patient complaining of right hip pain going on and not improving even with physical therapy    Current Outpatient Medications   Medication Sig Dispense Refill    tiZANidine (ZANAFLEX) 4 MG tablet Take 1 tablet by mouth 2 times daily 20 tablet 0    fluticasone (FLONASE) 50 MCG/ACT nasal spray USE 2 SPRAYS NASALLY DAILY 48 g 1    levothyroxine (SYNTHROID) 25 MCG tablet       calcium citrate-vitamin D (CITRICAL + D) 315-250 MG-UNIT TABS per tablet Take 2 tablets by mouth 2 times daily (with meals)      Probiotic Product (PROBIOTIC-10 PO) Take 1 tablet by mouth daily      NONFORMULARY Take by mouth daily      Loratadine-Pseudoephedrine (CLARITIN-D 12 HOUR PO) Take by mouth daily      FISH OIL Take  by mouth daily. No current facility-administered medications for this visit.        No Known Allergies    Past Medical History:   Diagnosis Date    Irregular menses     Lupus (HCC)     MVP (mitral valve prolapse)     Seasonal allergies        Past Surgical History:   Procedure Laterality Date    COLONOSCOPY      TUBAL LIGATION         Family History   Problem Relation Age of Onset    High Blood Pressure Father     Heart Disease Other     Cancer Other     Stroke Other     Other Neg Hx         blood clots     ROS   Constitutional:  Negative for fatigue, loss of appetite and unexpected weight change   HEENT            : Negative for neck stiffness and pain, no congestion or sinus pressure   Eyes                : No visual disturbance or pain   Cardiovascular: No chest pain or palpitations or leg swelling   Respiratory      : Negative for cough, shortness of breath or wheezing   Gastrointestinal: Negative for abdominal pain, constipation or diarrhea and bloating No nausea or vomiting   Genitourinary:     No urgency or frequency, no burning or hematuria   Musculoskeletal: Positive for arthralgias, back pain or myalgias   Skin                  : Negative for rash or erythema   Neurological    : Negative for dizziness, weakness, tremors ,light headedness or syncope   Psychiatric       : Negative for dysphoric mood, sleep disturbances, nervous or anxious, or decreased concentration   All other review of systems was negative    Objective  Physical Examination:    Nursing note reviewed    /60 (Site: Right Upper Arm, Position: Sitting, Cuff Size: Medium Adult)   Pulse 76   Temp 97.7 °F (36.5 °C) (Infrared)   Resp 16   Ht 5' 2\" (1.575 m)   Wt 147 lb (66.7 kg)   BMI 26.89 kg/m²   BP Readings from Last 3 Encounters:   07/29/21 110/60   05/04/21 118/78   01/28/21 110/68         Constitutional:  Elisabeth Anderson is oriented to place, person and time ,appears well-developed and well-nourished  HEENT:  Atraumatic and normocephalic, external ears normal bilaterally, nose normal no oropharyngeal exudate and is clear and moist  Eyes:  EOCM normal; conjunctivae normal; PERRLA bilaterally  Neck:  Normal range of motion, neck supple, no JVD and no thyromegaly  Cardiovascular:  RRR, normal heart sounds and intact distal pulses  Pulmonary:  effort normal and breath sounds normal bilaterally,no wheezes or rales, no respiratory distress  Abdominal:  Soft, non-tender; normal bowel sounds, no masses  Musculoskeletal:  Normal range of motion and no edema or tenderness bilaterally except there is decreased strength in the right leg compared to the left against resistance  No lymphadenopathy  Neurological:  alert, oriented, and normal reflexes bilaterally  Skin: warm and dry  Psychiatric:  normal mood and effect; behavior normal.    Labs:   Lab Results   Component Value Date    LABA1C 5.3 08/06/2018     Lab Results   Component Value Date    CHOL 174 08/07/2020     Lab Results   Component Value Date    HDL 61 08/07/2020     Lab Results   Component Value Date    LDLCALC 100 08/07/2020     Lab Results   Component Value Date    TRIG 65 08/07/2020     No components found for: [place], [place]  Lab Results   Component Value Date    WBC 3.0 (L) 08/07/2020    HGB 13.6 08/07/2020    HCT 40.7 08/07/2020    MCV 91 08/07/2020     08/07/2020     No results found for: INR, PROTIME  Lab Results   Component Value Date    GLUCOSE 90 08/07/2020    CREATININE 0.87 08/07/2020    BUN 16 08/07/2020     08/07/2020    K 3.9 08/07/2020     08/07/2020    CO2 27 08/07/2020     Lab Results   Component Value Date    ALT 20 08/07/2020    AST 23 08/07/2020    ALKPHOS 81 08/07/2020    BILITOT 0.7 08/07/2020     Lab Results   Component Value Date    LABPROT 7.1 03/21/2013    LABALBU 3.9 08/07/2020     Lab Results   Component Value Date    TSH 1.96 06/07/2021     Assessment:   Diagnosis Orders   1. Right hip pain  Comprehensive Metabolic Panel    CBC    Lipid Panel    Vitamin B12    Vitamin D 25 Hydroxy   2. Acquired hypothyroidism     3.  Environmental allergies           Plan:  Patient still has some right hip pain she has seen the orthopedic  and was referred to physical therapy and I did discuss about the options and also possible lumbar spine causing some pain and advised patient to try Zanaflex at nighttime before dinner and call me back in 2 weeks if no improvement will do a lumbar spine x-ray and if it comes back negative will refer back to the vascular specialist's  Patient's visit to the endocrinologist reviewed and patient is on Synthroid and recent lab work for [facility] and free T4 within normal limits  Patient's allergies are stable and continue current treatment  Labs can be provided that every mistake has been identified and corrected by editing.

## 2021-07-30 ENCOUNTER — PATIENT MESSAGE (OUTPATIENT)
Dept: INTERNAL MEDICINE CLINIC | Age: 61
End: 2021-07-30

## 2021-07-30 NOTE — TELEPHONE ENCOUNTER
From: Rogers Andrade  To: Haleigh Parham MD  Sent: 7/30/2021 8:27 AM EDT  Subject: Prescription Question    Hi     I was in for an appointment yesterday afternoon, and Dr Vaibhav Dasilva prescribed me some muscle relaxers. During my appointment, he said that I should take one at dinner time, since they could make me drowsy and would be working by bedtime, for about two weeks. When I picked up the prescription, the instructions say that I should take them twice a day. Should I follow his or the prescription instructions? I did take one last night and it did seem to make me tired (but I was tired already anyway!).     Thanks,    Anthony Benítez

## 2021-08-05 ENCOUNTER — TELEPHONE (OUTPATIENT)
Dept: INTERNAL MEDICINE CLINIC | Age: 61
End: 2021-08-05

## 2021-08-05 ENCOUNTER — HOSPITAL ENCOUNTER (OUTPATIENT)
Dept: PHYSICAL THERAPY | Facility: CLINIC | Age: 61
Setting detail: THERAPIES SERIES
Discharge: HOME | End: 2021-08-05
Payer: COMMERCIAL

## 2021-08-05 PROCEDURE — 97140 MANUAL THERAPY 1/> REGIONS: CPT

## 2021-08-05 PROCEDURE — 97110 THERAPEUTIC EXERCISES: CPT

## 2021-08-05 NOTE — FLOWSHEET NOTE
Clams 90 deg  2 sets fatigue lime  x        1/2 side plank hip abd  2 sets fatigue    x x       Quadruped               [employer] across pelvis   [facility] twist               Gym               Fig 4 3x30\"   x x       Supine hip flexor s 2' ea   x x       Strap Quad Stretch 3x30\"   x     Primal plank 3x20\"   x     Postrure ed x        No crossing legs, no standing weight shifted prolonged    Half Plank Dips x20   x        TVA bicycle w/ UE static band pull x20 Red cord x      Hip [place] 2xfatigue lime       45 hip abd 2xfatigue lime       sidestepping 2L purple x x     Monster walk 2L purple x x     Lax ball foot self mob x15  x x     Foam roller at wall hip hike 2 sets  x      SB stretch 3x30        HS stretch 3x30        3 direction heel tap 2x10 4\"  x  Foam roller at knee   Powerstride x15 12\"       Retro Lunge 2x15   x     Modified RDL x10 9# kb       Isotonic split squat  2x15                   Specific Instructions for next treatment:     Treatment Charges: Mins Units   []  Modalities     [x]  Ther Exercise 30 2   [x]  Manual Therapy 25 2   []  Ther Activities     []  Aquatics     []  Vasocompression     []  Other     Total Treatment time 55 4       Assessment: [x] Progressing toward goals. Most tension this date proximal hip flexor and quad. Improved mobility post hip mobs. Cont with lacrosse ball prior to SLS ex with pt reporting some tenderness. Able to increase reps on heel taps. Reviewed foot intrinsic ex for HEP. [] Other:  [x] Patient would continue to benefit from skilled physical therapy services in order to: meet goals listed below     STG: (to be met in 10 treatments)  1. ? Pain:<3/10 when laying down in bed at night  2. ? ROM: Normal hip extension to allow pt to properly activate glutes  3. ? Strength: 5/5 hip strength to allow pt to have normal mechanics with tennis and lifting  4. ? Function: Able to sleep 6-8 hours without walking due to pain in R hip  5.  Independent with Home Exercise Programs     LTG: (to be met in 20 treatments)  1. No pain R hip to allow pt to return to working out and playing tennis with less pain post  2. Pt able to demonstrate normal mechanics with gait and with fwd and lateral lunge  3. LEFI 0% disability       Pt. Education:  [x] Yes  [] No  [x] Reviewed Prior HEP/Ed  Method of Education: [] Verbal  [] Demo  [] Written  Comprehension of Education:  [x] Verbalizes understanding. [x] Demonstrates understanding. [] Needs review. [] Demonstrates/verbalizes HEP/Ed previously given. Plan: [x] Continue current frequency toward long and short term goals.     [] Specific Instructions for subsequent treatments:       Time In: 12:05 pm          Time Out: 1:02 pm    Electronically signed by:  Zulma Garcia PTA

## 2021-08-12 ENCOUNTER — HOSPITAL ENCOUNTER (OUTPATIENT)
Dept: PHYSICAL THERAPY | Facility: CLINIC | Age: 61
Setting detail: THERAPIES SERIES
Discharge: HOME | End: 2021-08-12
Payer: COMMERCIAL

## 2021-08-12 PROCEDURE — 97110 THERAPEUTIC EXERCISES: CPT

## 2021-08-12 PROCEDURE — 97140 MANUAL THERAPY 1/> REGIONS: CPT

## 2021-08-12 NOTE — FLOWSHEET NOTE
[] [facility]) - [facility] &  Therapy  [address]  [phone]  F: [phone] [] [address]  [address]   [address]  P: [phone]  F: [phone] [x] [address] &  Therapy  [address]  P: [phone]  F: [phone] [] [address]  P: [phone]  F: [phone] [] [address]  [facility]   [address]   [place]: [phone]  F: [phone]      Physical Therapy Daily Treatment Note    Date:  2021  Patient Name:  Shelli Moore    :  1960  MRN: [id number]  Physician: Dr. Shanel Curtis: [facility]  Medical Diagnosis:   OA R hip, ITB syndrome                     Rehab Codes: M25.551  Onset date:    20                                    Next Dr's appt.: unknown  Visit# / total visits:      Cancels/No Shows:     Subjective:    Pain:  [] Yes  [x] No Location: R hip Pain Rating: (0-10 scale) -/10  Pain altered Tx:  [x] No  [] Yes  Action:  Comments:Pt reports she is still getting some leg ache at night but not all the time. Definitely feel like here is more movement.  Went and got weights and have been doing a heavy weigh for hip distraction and a 3# weight for hip flexor stretch which has been helpful    Objective:  Modalities: PRN  Manual: DI  glute med, piriformis, hip flexor proximal and distal,  Hip MOB lateral glide, distraction   Precautions: standard  Exercises:  Exercise Reps/ Time Weight/ Level Issued for HEP   MOBO Y/N Comments   Prone               Flying squirrels  2x10   x         Hip ext (glut max)               [place] hip ext               Supine               2 legged bridges               1 legged bridges               PB hamstring curls           Hips to remain in neutral to ext   [employer] 90 deg  2 sets fatigue lime  x        1/2 side plank hip abd  2 sets fatigue    x x   Cues for leg in mild ext, neutral spine   Quadruped               Donkey kicks           Bar across pelvis   [employer] twist               Gym               Fig 4 3x30\"   x x       Supine hip flexor s 2' ea   x        Strap Quad Stretch 3x30\"   x     Primal plank 3x20\"        Postrure ed x        No crossing legs, no standing weight shifted prolonged    Half Plank Dips x20   x        TVA bicycle w/ UE static band pull x20 Red cord x      Hip [place] 2xfatigue lime       45 hip abd 2xfatigue lime       sidestepping 2L blue x x  feet   Monster walk 2L blue x x  feet   Lax ball foot self mob x15  x x     Foam roller at wall hip hike 2 sets  x x     SB stretch 3x30        HS stretch 3x30        3 direction heel tap 2x10 4\"  x  Foam roller at knee   Powerstride x15 12\"       Retro Lunge 2x15   x     Modified RDL x10 9# kb       Isotonic split squat  2x15   x     Pistol squat with TRX x10   x       Specific Instructions for next treatment:     Treatment Charges: Mins Units   []  Modalities     [x]  Ther Exercise 30 2   [x]  Manual Therapy 25 2   []  Ther Activities     []  Aquatics     []  Vasocompression     []  Other     Total Treatment time 55 4       Assessment: [x] Progressing toward goals. Pt is less limited in hip ER. IR is still mild pain in groin. Hip extension is normal. Still lacking strength at glute med and glute max. Form is improving but still like to compensate with quad and hamstrings with exercises. Pt given cues with good carryover this date.  She will follow up in 2-3 weeks for probable DC.      [] Other:  [x] Patient would continue to benefit from skilled physical therapy services in order to: meet goals listed below     STG: (to be met in 10 treatments)  1. ? Pain:<3/10 when laying down in bed at night  2. ? ROM: Normal hip extension to allow pt to properly activate glutes  3. ? Strength: 5/5 hip strength to allow pt to have normal mechanics with tennis and lifting  4. ? Function: Able to sleep 6-8 hours without walking due to pain in R hip  5. Independent with Home Exercise Programs     LTG: (to be met in 20 treatments)  1. No pain R hip to allow pt to return to working out and playing tennis with less pain post  2. Pt able to demonstrate normal mechanics with gait and with fwd and lateral lunge  3. LEFI 0% disability       Pt. Education:  [x] Yes  [] No  [x] Reviewed Prior HEP/Ed  Method of Education: [] Verbal  [] Demo  [] Written  Comprehension of Education:  [x] Verbalizes understanding. [x] Demonstrates understanding. [] Needs review. [] Demonstrates/verbalizes HEP/Ed previously given. Plan: [x] Continue current frequency toward long and short term goals.     [] Specific Instructions for subsequent treatments:       Time In: 3:05 pm          Time Out: 4:00 pm    Electronically signed by:  Renee Mo, PT

## 2021-08-19 ENCOUNTER — APPOINTMENT (OUTPATIENT)
Dept: PHYSICAL THERAPY | Facility: CLINIC | Age: 61
End: 2021-08-19
Payer: COMMERCIAL

## 2021-09-02 ENCOUNTER — HOSPITAL ENCOUNTER (OUTPATIENT)
Dept: PHYSICAL THERAPY | Facility: CLINIC | Age: 61
Setting detail: THERAPIES SERIES
Discharge: HOME | End: 2021-09-02
Payer: COMMERCIAL

## 2021-09-02 PROCEDURE — 97110 THERAPEUTIC EXERCISES: CPT

## 2021-09-02 PROCEDURE — 97140 MANUAL THERAPY 1/> REGIONS: CPT

## 2021-09-02 NOTE — FLOWSHEET NOTE
[] [facility]) - [facility] &  Therapy  [address]  [phone]  F: [phone] [] [address]  [facility]   [address]  P: [phone]  F: [phone] [x] [address] &  Therapy  [address]  P: [phone]  F: [phone] [] [address]  P: [phone]  F: [phone] [] [address]  [place]   [address]   [place]: [phone]  F: [phone]      Physical Therapy Daily Treatment Note/Discharge Note    Date:  2021  Patient Name:  Rossi Shine    :  1960  MRN: [id number]  Physician: Dr. Azucena Juarez: [facility]  Medical Diagnosis:   OA R hip, ITB syndrome                     Rehab Codes: M25.551  Onset date:    20                                    Next 's appt.: unknown  Visit# / total visits:      Cancels/No Shows:     Subjective:    Pain:  [] Yes  [x] No Location: R hip Pain Rating: (0-10 scale) 0/10 now but 2/10 playing tennis this date  Pain altered Tx:  [x] No  [] Yes  Action:  Comments:Pt reports she Has been doing better since purchasing a weight and doing self long axis distraction. She reports she usually doesn't have pain with playing tennis but she did not perform the weighted circumduction stretch prior like she usually does.    Objective:  Modalities: PRN  Manual: DI  piriformis, hip flexor proximal and distal,  [employer] TFL and quad  Precautions: standard  Exercises:  Exercise Reps/ Time Weight/ Level Issued for HEP   MOBO Y/N Comments   Prone               Flying squirrels  2x10   x         Hip ext (glut max)               [place] hip ext               Supine               2 legged bridges               1 legged bridges               PB hamstring curls Hips to remain in neutral to ext   [employer] 90 deg  2 sets fatigue lime  x        1/2 side plank hip abd  2 sets fatigue    x    Cues for leg in mild ext, neutral spine   Quadruped               Donkey kicks           Bar across pelvis   [place] twist               Gym               Fig 4 3x30\"   x        Supine hip flexor s 2' ea   x        Strap Quad Stretch 3x30\"  x x     Standing quad stretch x30\"  x x     Primal plank 3x20\"        Postrure ed x        No crossing legs, no standing weight shifted prolonged    Half Plank Dips x20   x        TVA bicycle w/ UE static band pull x20 Red cord x      Hip [place] 2xfatigue lime       45 hip abd 2xfatigue lime       sidestepping 2L purple x x  feet   Monster walk 2L purple x x  feet   Lax ball foot self mob x15  x      Foam roller at wall hip hike 2 sets  x      SB stretch 3x30        HS stretch 3x30        3 direction heel tap 2x10 4\"    Foam roller at knee   Hip circles x15 Blue x x     Diagonal hip extension kicks x15 Blue x x                                  ROM  Hip Ext WNL stiff end range        ABD WNL        ER WNL        IR WNL stiff end range  Strength  Hip ext 4+/5        Abd 4/5        ER 30/90 5/5  Gait  Normal  FOM  LEFI 78/80= 2.5% functional limitation    Treatment Charges: Mins Units   []  Modalities     [x]  Ther Exercise 25 2   [x]  Manual Therapy 15 1   []  Ther Activities     []  Aquatics     []  Vasocompression     []  Other     Total Treatment time 40 3       Assessment: Pt is doing well. Overall better control of symptoms. She is able to sleep, play tennis and workout with minimal pain and stiffness. She has reached max benefit from formal PT at this time and has what she needs to continue to strengthen her hip. Encouraged her to keep up the maintenance routine of stretching daily especially     STG: (to be met in 10 treatments)  ?  Pain:<3/10 when laying down in bed at night  MET  ? ROM: Normal hip extension to allow pt to properly activate glutes  MET  ? Strength: 5/5 hip strength to allow pt to have normal mechanics with tennis and lifting Partially Met has HEP to address areas of weakness  ? Function: Able to sleep 6-8 hours without walking due to pain in R hip  MET  Independent with Home Exercise Programs MET     LTG: (to be met in 20 treatments)  No pain R hip to allow pt to return to working out and playing tennis with less pain post  At time Met  Pt able to demonstrate normal mechanics with gait and with fwd and lateral lunge  MET  LEFI 0% disability Not Met with pt at 2.5% functional limitation       Pt. Education:  [x] Yes  [] No  [x] Reviewed Prior HEP/Ed  Method of Education: [] Verbal  [] Demo  [] Written  Comprehension of Education:  [x] Verbalizes understanding. [x] Demonstrates understanding. [] Needs review. [] Demonstrates/verbalizes HEP/Ed previously given.      Plan: DC from PT at this time with IND HEP      Time In: 3:05 pm          Time Out: 4:00 pm    Electronically signed by:  Salima Medina PT

## 2021-09-20 ENCOUNTER — OFFICE VISIT (OUTPATIENT)
Dept: ORTHOPEDIC SURGERY | Age: 61
End: 2021-09-20
Payer: COMMERCIAL

## 2021-09-20 VITALS — SYSTOLIC BLOOD PRESSURE: 118 MMHG | DIASTOLIC BLOOD PRESSURE: 72 MMHG | HEART RATE: 84 BPM

## 2021-09-20 DIAGNOSIS — M25.351 HIP INSTABILITY, RIGHT: Primary | ICD-10-CM

## 2021-09-20 PROCEDURE — 99213 OFFICE O/P EST LOW 20 MIN: CPT | Performed by: FAMILY MEDICINE

## 2021-09-20 NOTE — PROGRESS NOTES
Sports Medicine Consultation     CHIEF COMPLAINT:  Hip Pain (Rt hip f/u. no new injury. now has pain with activity. finished physical therapy 9/2/2021)      HPI:  Doug Alba is a 62y.o. year old female who is a  established patient being seen for regarding follow up of a pre-existing problem right hip pain. The pain has been present for 8 month(s). The patient recalls a tennis injury. The patient has tried pt and massage some improvement. The pain is described as achy. There is  pain on weightbearing mostly after tennis. There is is not painful popping and clicking. The hip does not catch or lock. It has not given out. It is not stiff upon arising from sitting. It is  painful lying on the affected side. she has a past medical history of Irregular menses, Lupus ([address] MVP (mitral valve prolapse), and Seasonal allergies. she has a past surgical history that includes Tubal ligation and Colonoscopy. family history includes Cancer in an other family member; Heart Disease in an other family member; High Blood Pressure in her father; Stroke in an other family member.     Social History     Socioeconomic History    Marital status:      Spouse name: Not on file    Number of children: Not on file    Years of education: Not on file    Highest education level: Not on file   Occupational History    Not on file   Tobacco Use    Smoking status: Never Smoker    Smokeless tobacco: Never Used   Substance and Sexual Activity    Alcohol use: Yes     Comment: occasional    Drug use: No    Sexual activity: Yes     Partners: Male   Other Topics Concern    Not on file   Social History Narrative    Not on file     Social Determinants of Health     Financial Resource Strain: Low Risk     Difficulty of Paying Living Expenses: Not hard at all   Food Insecurity: No Food Insecurity    Worried About [employer] [employer] in the Last Year: Never true    [address] [facility] in the Last Year: Never true   Transportation Needs:     Lack of Transportation (Medical):  Lack of Transportation (Non-Medical):    Physical Activity:     Days of Exercise per Week:     Minutes of Exercise per Session:    Stress:     Feeling of Stress :    Social Connections:     Frequency of Communication with Friends and Family:     Frequency of Social Gatherings with Friends and Family:     Attends Hinduism Services:     Active Member of Clubs or Organizations:     Attends Club or Organization Meetings:     Marital Status:    Intimate Partner Violence:     Fear of Current or Ex-Partner:     Emotionally Abused:     Physically Abused:     Sexually Abused:        Current Outpatient Medications   Medication Sig Dispense Refill    tiZANidine (ZANAFLEX) 4 MG tablet Take 1 tablet by mouth 2 times daily 20 tablet 0    fluticasone (FLONASE) 50 MCG/ACT nasal spray USE 2 SPRAYS NASALLY DAILY 48 g 1    levothyroxine (SYNTHROID) 25 MCG tablet       calcium citrate-vitamin D (CITRICAL + D) 315-250 MG-UNIT TABS per tablet Take 2 tablets by mouth 2 times daily (with meals)      Probiotic Product (PROBIOTIC-10 PO) Take 1 tablet by mouth daily      NONFORMULARY Take by mouth daily      Loratadine-Pseudoephedrine (CLARITIN-D 12 HOUR PO) Take by mouth daily      FISH OIL Take  by mouth daily. No current facility-administered medications for this visit. Allergies:  shehas No Known Allergies. ROS:  CV:  Denies chest pain; palpitations; shortness of breath; swelling of feet, ankles; and loss of consciousness. CON: Denies fever and dizziness. ENT:  Denies hearing loss / ringing, ear infections hoarseness, and swallowing problems. RESP:  Denies chronic cough, spitting up blood, and asthma/wheezing. GI: Denies abdominal pain, change in bowel habits, nausea or vomiting, and blood in stools. :  Denies frequent urination, burning or painful urination, blood in the urine, and bladder incontinence.   NEURO: Denies headache, memory loss, sleep disturbance, and tremor or movement disorder. PHYSICAL EXAM:   /72   Pulse 84   GENERAL: Samanta Fair is a 62 y.o. female who is alert and oriented and sitting comfortably in our office. SKIN:  Intact without rashes, lesions or ulcerations. NEURO: Sensation to the extremity is intact. VASC:  Capillary refill is less than 3 seconds. Distal pulses are palpable. There is no lymphadenopathy. Hip Exam  Musculoskeletal/Neurologic:  Palpation-Tenderness:some IA and lateral ttp  ROM- Right hip IR 45 degrees, ER 45 degrees  There is  hip rotational pain  Strength-WNL  Sensation-normal to light touch  ELLEN: negative  FADIR:negative  Juan: negative  Bicycle testing negative  Hip labral stress testing negative  Sensation-normal to light touch    Gait: normal    PSYCH:  Good fund of knowledge and displays understanding of exam.    RADIOLOGY: No results found. IMPRESSION:     1. Hip instability, right          PLAN:   We discussed some of the etiologies and natural histories of     ICD-10-CM    1. Hip instability, right  M25.351 MRI HIP RIGHT WO CONTRAST   . We discussed the various treatment alternatives including anti-inflammatory medications, physical therapy, injections, further imaging studies and as a last resort surgery. At this point patient has failed conservative management we will get an MRI of her hip to look for further reasons for issues prior to additional treatment. We discussed injections but will hold at this point    Return to clinic in No follow-ups on file. Matthias Aldana     Please be aware portions of this note were completed using voice recognition software and unforeseen errors may have occurred    Electronically signed by Bruna Rucker DO, FAOASM  on 9/20/21 at 4:03 PM EDT

## 2021-10-08 DIAGNOSIS — M25.351 HIP INSTABILITY, RIGHT: Primary | ICD-10-CM

## 2021-10-19 RX ORDER — FLUTICASONE PROPIONATE 50 MCG
SPRAY, SUSPENSION (ML) NASAL
Qty: 48 G | Refills: 1 | Status: SHIPPED | OUTPATIENT
Start: 2021-10-19 | End: 2022-04-04

## 2021-10-19 NOTE — TELEPHONE ENCOUNTER
Sabrina Pleitez is calling to request a refill on the following medication(s):    Medication Request:    Last filled 4/7/21 48g with 1 RF    Requested Prescriptions     Pending Prescriptions Disp Refills    fluticasone (FLONASE) 50 MCG/ACT nasal spray [Pharmacy Med Name: FLUTICASONE  SPR 50MCG RX] 48 g 1     Sig: USE 2 SPRAYS NASALLY DAILY       Last Visit Date (If Applicable):  1/01/1174    Next Visit Date:    1/27/2022

## 2021-10-21 ENCOUNTER — TELEPHONE (OUTPATIENT)
Dept: ORTHOPEDIC SURGERY | Age: 61
End: 2021-10-21

## 2021-10-22 NOTE — TELEPHONE ENCOUNTER
MRI results left on VM. Also stated that dr Ginette Qureshi suggested possible next plan of treatment could be an injection into her her hip. Advised pt to call back to make f/u appt.

## 2021-11-04 ENCOUNTER — TELEPHONE (OUTPATIENT)
Dept: INTERNAL MEDICINE CLINIC | Age: 61
End: 2021-11-04

## 2021-11-04 NOTE — TELEPHONE ENCOUNTER
Pt called to report her  is + for covid. took at home test.  Pt is negative. reports sinus congestion which is not new for her. Denied fever/cough/wheezing. Asking for quarantine guidelines. Please advise. Pt has been vaccinated.

## 2021-11-21 ENCOUNTER — PATIENT MESSAGE (OUTPATIENT)
Dept: INTERNAL MEDICINE CLINIC | Age: 61
End: 2021-11-21

## 2021-11-22 RX ORDER — CEFUROXIME AXETIL 500 MG/1
500 TABLET ORAL 2 TIMES DAILY
Qty: 20 TABLET | Refills: 0 | Status: SHIPPED | OUTPATIENT
Start: 2021-11-22 | End: 2022-08-31 | Stop reason: SDUPTHER

## 2021-11-29 ENCOUNTER — OFFICE VISIT (OUTPATIENT)
Dept: ORTHOPEDIC SURGERY | Age: 61
End: 2021-11-29
Payer: COMMERCIAL

## 2021-11-29 VITALS — DIASTOLIC BLOOD PRESSURE: 80 MMHG | SYSTOLIC BLOOD PRESSURE: 122 MMHG

## 2021-11-29 DIAGNOSIS — M70.61 GREATER TROCHANTERIC BURSITIS OF RIGHT HIP: Primary | ICD-10-CM

## 2021-11-29 PROCEDURE — 20610 DRAIN/INJ JOINT/BURSA W/O US: CPT | Performed by: FAMILY MEDICINE

## 2021-11-29 RX ORDER — TRIAMCINOLONE ACETONIDE 40 MG/ML
40 INJECTION, SUSPENSION INTRA-ARTICULAR; INTRAMUSCULAR ONCE
Status: COMPLETED | OUTPATIENT
Start: 2021-11-29 | End: 2021-11-29

## 2021-11-29 RX ORDER — BUPIVACAINE HYDROCHLORIDE 5 MG/ML
2 INJECTION, SOLUTION PERINEURAL ONCE
Status: COMPLETED | OUTPATIENT
Start: 2021-11-29 | End: 2021-11-29

## 2021-11-29 RX ADMIN — TRIAMCINOLONE ACETONIDE 40 MG: 40 INJECTION, SUSPENSION INTRA-ARTICULAR; INTRAMUSCULAR at 15:06

## 2021-11-29 RX ADMIN — BUPIVACAINE HYDROCHLORIDE 10 MG: 5 INJECTION, SOLUTION PERINEURAL at 15:05

## 2021-11-29 NOTE — PROGRESS NOTES
Sports Medicine Consultation     CHIEF COMPLAINT:  Hip Pain (Right hip f/u. )      HPI:  Mert Stone is a 62y.o. year old female who is a  established patient being seen for regarding follow up of a pre-existing problem right hip pain. The pain has been present for 10 month(s). The patient recalls a no new injury. The patient has tried PT in the past with improvement. The pain is described as unstable. There is  pain on weightbearing. There is is not painful popping and clicking. The hip does not catch or lock. It has not given out. It is not stiff upon arising from sitting. It is  painful lying on the affected side. she has a past medical history of Irregular menses, Lupus ([address] MVP (mitral valve prolapse), and Seasonal allergies. she has a past surgical history that includes Tubal ligation and Colonoscopy. family history includes Cancer in an other family member; Heart Disease in an other family member; High Blood Pressure in her father; Stroke in an other family member. Social History     Socioeconomic History    Marital status:      Spouse name: Not on file    Number of children: Not on file    Years of education: Not on file    Highest education level: Not on file   Occupational History    Not on file   Tobacco Use    Smoking status: Never Smoker    Smokeless tobacco: Never Used   Substance and Sexual Activity    Alcohol use: Yes     Comment: occasional    Drug use: No    Sexual activity: Yes     Partners: Male   Other Topics Concern    Not on file   Social History Narrative    Not on file     Social Determinants of Health     Financial Resource Strain: Low Risk     Difficulty of Paying Living Expenses: Not hard at all   Food Insecurity: No Food Insecurity    Worried About [address] in the Last Year: Never true    [address] in the Last Year: Never true   Transportation Needs:     Lack of Transportation (Medical):  Not on file    Lack of Transportation (Non-Medical): Not on file   Physical Activity:     Days of Exercise per Week: Not on file    Minutes of Exercise per Session: Not on file   Stress:     Feeling of Stress : Not on file   Social Connections:     Frequency of Communication with Friends and Family: Not on file    Frequency of Social Gatherings with Friends and Family: Not on file    Attends Jewish Services: Not on file    Active Member of [employer] or Organizations: Not on file    Attends Club or Organization Meetings: Not on file    Marital Status: Not on file   Intimate Partner Violence:     Fear of Current or Ex-Partner: Not on file    Emotionally Abused: Not on file    Physically Abused: Not on file    Sexually Abused: Not on file   Housing Stability:     Unable to Pay for Housing in the Last Year: Not on file    Number of [place] in the Last Year: Not on file    Unstable Housing in the Last Year: Not on file       Current Outpatient Medications   Medication Sig Dispense Refill    cefUROXime (CEFTIN) 500 MG tablet Take 1 tablet by mouth 2 times daily for 10 days 20 tablet 0    fluticasone (FLONASE) 50 MCG/ACT nasal spray USE 2 SPRAYS NASALLY DAILY 48 g 1    tiZANidine (ZANAFLEX) 4 MG tablet Take 1 tablet by mouth 2 times daily 20 tablet 0    levothyroxine (SYNTHROID) 25 MCG tablet       calcium citrate-vitamin D (CITRICAL + D) 315-250 MG-UNIT TABS per tablet Take 2 tablets by mouth 2 times daily (with meals)      Probiotic Product (PROBIOTIC-10 PO) Take 1 tablet by mouth daily      NONFORMULARY Take by mouth daily      Loratadine-Pseudoephedrine (CLARITIN-D 12 HOUR PO) Take by mouth daily      FISH OIL Take  by mouth daily. No current facility-administered medications for this visit. Allergies:  shehas No Known Allergies. ROS:  CV:  Denies chest pain; palpitations; shortness of breath; swelling of feet, ankles; and loss of consciousness. CON: Denies fever and dizziness.   ENT: Denies hearing loss / ringing, ear infections hoarseness, and swallowing problems. RESP:  Denies chronic cough, spitting up blood, and asthma/wheezing. GI: Denies abdominal pain, change in bowel habits, nausea or vomiting, and blood in stools. :  Denies frequent urination, burning or painful urination, blood in the urine, and bladder incontinence. NEURO:  Denies headache, memory loss, sleep disturbance, and tremor or movement disorder. PHYSICAL EXAM:   /80   GENERAL: Lilly Woods is a 62 y.o. female who is alert and oriented and sitting comfortably in our office. SKIN:  Intact without rashes, lesions or ulcerations. NEURO: Sensation to the extremity is intact. VASC:  Capillary refill is less than 3 seconds. Distal pulses are palpable. There is no lymphadenopathy. Hip Exam  Musculoskeletal/Neurologic:  Palpation-Tenderness:GT  ROM- Right hip IR 45 degrees, ER 45 degrees  There is not hip rotational pain  Strength-WNL  Sensation-normal to light touch  ELLEN: positive  FADIR:positive  Juan: negative  Bicycle testing negative  Hip labral stress testing negative  Sensation-normal to light touch    Gait: normal    PSYCH:  Good fund of knowledge and displays understanding of exam.    RADIOLOGY: No results found. IMPRESSION:   No diagnosis found. PLAN:   We discussed some of the etiologies and natural histories of No diagnosis found. .  We discussed the various treatment alternatives including anti-inflammatory medications, physical therapy, injections, further imaging studies and as a last resort surgery. At this point patient's pain is more greater trochanter than intra-articular and I do think a trial of greater trochanteric bursal injection is appropriate. And after the risk, benefits, alternative procedure itself were discussed. Verbal consent was obtained.   Patient's right lateral hip was prepped in sterile manner with Betadine skin was then cooled cold spray then recleaned with alcohol prep I then injected 1 mL 40 mg Kenalog and 2 mL 0.5% Marcaine the point of maximal pain intensity at the greater trochanter of the right hip patient tolerated procedure well felt immediate pain relief follow-up in 1 month    Return to clinic in No follow-ups on file. Izabella Wallace     Please be aware portions of this note were completed using voice recognition software and unforeseen errors may have occurred    Electronically signed by Lizbeth Frederick DO, FAOASM  on 11/29/21 at 2:19 PM EST

## 2021-12-06 ENCOUNTER — HOSPITAL ENCOUNTER (OUTPATIENT)
Age: 61
Setting detail: SPECIMEN
Discharge: HOME | End: 2021-12-06

## 2021-12-06 DIAGNOSIS — M25.551 RIGHT HIP PAIN: ICD-10-CM

## 2021-12-06 LAB
ALBUMIN SERPL-MCNC: 4.3 G/DL (ref 3.5–5.2)
ALBUMIN/GLOBULIN RATIO: 1.4 (ref 1–2.5)
ALP BLD-CCNC: 87 U/L (ref 35–104)
ALT SERPL-CCNC: 20 U/L (ref 5–33)
ANION GAP SERPL CALCULATED.3IONS-SCNC: 14 MMOL/L (ref 9–17)
AST SERPL-CCNC: 25 U/L
BILIRUB SERPL-MCNC: 0.51 MG/DL (ref 0.3–1.2)
BUN BLDV-MCNC: 16 MG/DL (ref 8–23)
BUN/CREAT BLD: NORMAL (ref 9–20)
CALCIUM SERPL-MCNC: 9.4 MG/DL (ref 8.6–10.4)
CHLORIDE BLD-SCNC: 103 MMOL/L (ref 98–107)
CHOLESTEROL/HDL RATIO: 2.9
CHOLESTEROL: 249 MG/DL
CO2: 23 MMOL/L (ref 20–31)
CREAT SERPL-MCNC: 0.87 MG/DL (ref 0.5–0.9)
GFR AFRICAN AMERICAN: >60 ML/MIN
GFR NON-AFRICAN AMERICAN: >60 ML/MIN
GFR SERPL CREATININE-BSD FRML MDRD: NORMAL ML/MIN/{1.73_M2}
GFR SERPL CREATININE-BSD FRML MDRD: NORMAL ML/MIN/{1.73_M2}
GLUCOSE BLD-MCNC: 97 MG/DL (ref 70–99)
HCT VFR BLD CALC: 45.2 % (ref 36.3–47.1)
HDLC SERPL-MCNC: 86 MG/DL
HEMOGLOBIN: 14.9 G/DL (ref 11.9–15.1)
LDL CHOLESTEROL: 156 MG/DL (ref 0–130)
MCH RBC QN AUTO: 30.7 PG (ref 25.2–33.5)
MCHC RBC AUTO-ENTMCNC: 33 G/DL (ref 28.4–34.8)
MCV RBC AUTO: 93.2 FL (ref 82.6–102.9)
NRBC AUTOMATED: 0 PER 100 WBC
PDW BLD-RTO: 12.1 % (ref 11.8–14.4)
PLATELET # BLD: 305 K/UL (ref 138–453)
PMV BLD AUTO: 11 FL (ref 8.1–13.5)
POTASSIUM SERPL-SCNC: 4.1 MMOL/L (ref 3.7–5.3)
RBC # BLD: 4.85 M/UL (ref 3.95–5.11)
SODIUM BLD-SCNC: 140 MMOL/L (ref 135–144)
THYROXINE, FREE: 1.47 NG/DL (ref 0.93–1.7)
TOTAL PROTEIN: 7.4 G/DL (ref 6.4–8.3)
TRIGL SERPL-MCNC: 37 MG/DL
TSH SERPL DL<=0.05 MIU/L-ACNC: 1.55 MIU/L (ref 0.3–5)
VITAMIN B-12: 795 PG/ML (ref 232–1245)
VITAMIN D 25-HYDROXY: 49.1 NG/ML (ref 30–100)
VLDLC SERPL CALC-MCNC: ABNORMAL MG/DL (ref 1–30)
WBC # BLD: 5.2 K/UL (ref 3.5–11.3)

## 2021-12-30 ENCOUNTER — OFFICE VISIT (OUTPATIENT)
Dept: ORTHOPEDIC SURGERY | Age: 61
End: 2021-12-30
Payer: COMMERCIAL

## 2021-12-30 VITALS — SYSTOLIC BLOOD PRESSURE: 117 MMHG | DIASTOLIC BLOOD PRESSURE: 86 MMHG | HEART RATE: 70 BPM

## 2021-12-30 DIAGNOSIS — M16.11 PRIMARY OSTEOARTHRITIS OF RIGHT HIP: Primary | ICD-10-CM

## 2021-12-30 PROCEDURE — 99213 OFFICE O/P EST LOW 20 MIN: CPT | Performed by: FAMILY MEDICINE

## 2021-12-30 RX ORDER — MELOXICAM 15 MG/1
15 TABLET ORAL DAILY
Qty: 30 TABLET | Refills: 3 | Status: SHIPPED | OUTPATIENT
Start: 2021-12-30 | End: 2022-03-11 | Stop reason: ALTCHOICE

## 2021-12-30 NOTE — PROGRESS NOTES
Sports Medicine Consultation     CHIEF COMPLAINT:  Hip Pain (Rt hip f/u. worse. getting catching. more pain in groin now. 1wk relief from GT injection)      HPI:  Blayne Donnelly is a 62y.o. year old female who is a  established patient being seen for regarding follow up of a pre-existing problem right hip pain. The pain has been present for year(s). The patient recalls a no new injury. The patient has tried IA and GT injection with improvement. The pain is described as sharp. There is  pain on weightbearing. There is is not painful popping and clicking. The hip does catch or lock. It has not given out. It is  stiff upon arising from sitting. It is not painful lying on the affected side. she has a past medical history of Irregular menses, Lupus ([address] MVP (mitral valve prolapse), and Seasonal allergies. she has a past surgical history that includes Tubal ligation and Colonoscopy. family history includes Cancer in an other family member; Heart Disease in an other family member; High Blood Pressure in her father; Stroke in an other family member.     Social History     Socioeconomic History    Marital status:      Spouse name: Not on file    Number of children: Not on file    Years of education: Not on file    Highest education level: Not on file   Occupational History    Not on file   Tobacco Use    Smoking status: Never Smoker    Smokeless tobacco: Never Used   Substance and Sexual Activity    Alcohol use: Yes     Comment: occasional    Drug use: No    Sexual activity: Yes     Partners: Male   Other Topics Concern    Not on file   Social History Narrative    Not on file     Social Determinants of Health     Financial Resource Strain: Low Risk     Difficulty of Paying Living Expenses: Not hard at all   Food Insecurity: No Food Insecurity    Worried About [address] in the Last Year: Never true    [facility] in the Last Year: Never true ringing, ear infections hoarseness, and swallowing problems. RESP:  Denies chronic cough, spitting up blood, and asthma/wheezing. GI: Denies abdominal pain, change in bowel habits, nausea or vomiting, and blood in stools. :  Denies frequent urination, burning or painful urination, blood in the urine, and bladder incontinence. NEURO:  Denies headache, memory loss, sleep disturbance, and tremor or movement disorder. PHYSICAL EXAM:   /86   Pulse 70   GENERAL: Sabrina Pleitez is a 62 y.o. female who is alert and oriented and sitting comfortably in our office. SKIN:  Intact without rashes, lesions or ulcerations. NEURO: Sensation to the extremity is intact. VASC:  Capillary refill is less than 3 seconds. Distal pulses are palpable. There is no lymphadenopathy. Hip Exam  Musculoskeletal/Neurologic:  Palpation-Tenderness: Anterior hip join  ROM- Right hip IR 10 degrees, ER 70 degrees  There is  hip rotational pain  Strength-WNL  Sensation-normal to light touch  ELLEN: negative  FADIR:negative  Juan: negative  Bicycle testing negative  Hip labral stress testing negative  Sensation-normal to light touch    Gait: antalgic    PSYCH:  Good fund of knowledge and displays understanding of exam.    RADIOLOGY: No results found. IMPRESSION:     1. Primary osteoarthritis of right hip          PLAN:   We discussed some of the etiologies and natural histories of     ICD-10-CM    1. Primary osteoarthritis of right hip  M16.11    . We discussed the various treatment alternatives including anti-inflammatory medications, physical therapy, injections, further imaging studies and as a last resort surgery. At this point working to try some oral anti-inflammatories on a regular basis the Mobic was called into the pharmacy if she fails improve we may consider intra-articular injection. Patient voiced understanding agreement this plan. Return to clinic in No follow-ups on file. Ayo Rainey     Please be aware portions of this note were completed using voice recognition software and unforeseen errors may have occurred    Electronically signed by Thang Sandy DO, FAOASM  on 12/30/21 at 2:33 PM EST

## 2022-01-27 ENCOUNTER — OFFICE VISIT (OUTPATIENT)
Dept: INTERNAL MEDICINE CLINIC | Age: 62
End: 2022-01-27
Payer: COMMERCIAL

## 2022-01-27 VITALS
TEMPERATURE: 98.1 F | HEART RATE: 95 BPM | DIASTOLIC BLOOD PRESSURE: 74 MMHG | BODY MASS INDEX: 27.42 KG/M2 | HEIGHT: 62 IN | SYSTOLIC BLOOD PRESSURE: 120 MMHG | WEIGHT: 149 LBS | OXYGEN SATURATION: 98 %

## 2022-01-27 DIAGNOSIS — M25.551 RIGHT HIP PAIN: ICD-10-CM

## 2022-01-27 DIAGNOSIS — E03.9 ACQUIRED HYPOTHYROIDISM: ICD-10-CM

## 2022-01-27 DIAGNOSIS — E78.00 HIGH CHOLESTEROL: Primary | ICD-10-CM

## 2022-01-27 PROCEDURE — 99214 OFFICE O/P EST MOD 30 MIN: CPT | Performed by: INTERNAL MEDICINE

## 2022-01-27 ASSESSMENT — PATIENT HEALTH QUESTIONNAIRE - PHQ9
SUM OF ALL RESPONSES TO PHQ9 QUESTIONS 1 & 2: 0
1. LITTLE INTEREST OR PLEASURE IN DOING THINGS: 0
SUM OF ALL RESPONSES TO PHQ QUESTIONS 1-9: 0
2. FEELING DOWN, DEPRESSED OR HOPELESS: 0
SUM OF ALL RESPONSES TO PHQ QUESTIONS 1-9: 0

## 2022-01-27 NOTE — PROGRESS NOTES
Yahir Rollins is a 62 y.o. female who presents for   Chief Complaint   Patient presents with    6 Month Follow-Up     questins about aneurysm concerns    Discuss Medications     cholestrol medication questions    Health Maintenance     Hep C, HIV screen tdap, cervical cancer screen, shingles    and follow up of chronic medical problems. Patient Active Problem List   Diagnosis    MVP (mitral valve prolapse)    Lupus (HCC)    Irregular menses     HPI  Here for follow-up on recent lab work and wants to discuss about the cholesterol and also wants to know how to diagnose about aortic aneurysm and patient's mother had a history of renal failure and had first kidney transplant in the [facility] and patient's mother passed away  with pancreatic rupture    Current Outpatient Medications   Medication Sig Dispense Refill    meloxicam (MOBIC) 15 MG tablet Take 1 tablet by mouth daily 30 tablet 3    fluticasone (FLONASE) 50 MCG/ACT nasal spray USE 2 SPRAYS NASALLY DAILY 48 g 1    levothyroxine (SYNTHROID) 25 MCG tablet       calcium citrate-vitamin D (CITRICAL + D) 315-250 MG-UNIT TABS per tablet Take 2 tablets by mouth 2 times daily (with meals)      Probiotic Product (PROBIOTIC-10 PO) Take 1 tablet by mouth daily      NONFORMULARY Take by mouth daily      Loratadine-Pseudoephedrine (CLARITIN-D 12 HOUR PO) Take by mouth daily      FISH OIL Take  by mouth daily.  tiZANidine (ZANAFLEX) 4 MG tablet Take 1 tablet by mouth 2 times daily (Patient not taking: Reported on 1/27/2022) 20 tablet 0     No current facility-administered medications for this visit.        No Known Allergies    Past Medical History:   Diagnosis Date    Irregular menses     Lupus (HCC)     MVP (mitral valve prolapse)     Seasonal allergies        Past Surgical History:   Procedure Laterality Date    COLONOSCOPY      TUBAL LIGATION         Family History   Problem Relation Age of Onset    High Blood Pressure Father     Heart Disease Other     Cancer Other     Stroke Other     Other Neg Hx         blood clots     ROS   Constitutional:  Negative for fatigue, loss of appetite and unexpected weight change   HEENT            : Negative for neck stiffness and pain, no congestion or sinus pressure   Eyes                : No visual disturbance or pain   Cardiovascular: No chest pain or palpitations or leg swelling   Respiratory      : Negative for cough, shortness of breath or wheezing   Gastrointestinal: Negative for abdominal pain, constipation or diarrhea and bloating No nausea or vomiting   Genitourinary:     No urgency or frequency, no burning or hematuria   Musculoskeletal: No arthralgias, back pain or myalgias   Skin                  : Negative for rash or erythema   Neurological    : Negative for dizziness, weakness, tremors ,light headedness or syncope   Psychiatric       : Negative for dysphoric mood, sleep disturbances, nervous or anxious, or decreased concentration   All other review of systems was negative    Objective  Physical Examination:    Nursing note reviewed    /74 (Site: Left Upper Arm, Position: Sitting, Cuff Size: Large Adult)   Pulse 95   Temp 98.1 °F (36.7 °C) (Temporal)   Ht 5' 2\" (1.575 m)   Wt 149 lb (67.6 kg)   SpO2 98%   BMI 27.25 kg/m²   BP Readings from Last 3 Encounters:   01/27/22 120/74   12/30/21 117/86   11/29/21 122/80         Constitutional:  Ivon Ospina is oriented to place, person and time ,appears well-developed and well-nourished  HEENT:  Atraumatic and normocephalic, external ears normal bilaterally, nose normal no oropharyngeal exudate and is clear and moist  Eyes:  EOCM normal; conjunctivae normal; PERRLA bilaterally  Neck:  Normal range of motion, neck supple, no JVD and no thyromegaly  Cardiovascular:  RRR, normal heart sounds and intact distal pulses  Pulmonary:  effort normal and breath sounds normal bilaterally,no wheezes or rales, no respiratory distress  Abdominal: Soft, non-tender; normal bowel sounds, no masses  Musculoskeletal:  Normal range of motion and no edema or tenderness bilaterally  No lymphadenopathy  Neurological:  alert, oriented, and normal reflexes bilaterally  Skin: warm and dry  Psychiatric:  normal mood and effect; behavior normal.    Labs:   Lab Results   Component Value Date    LABA1C 5.3 08/06/2018     Lab Results   Component Value Date    CHOL 249 (H) 12/06/2021     Lab Results   Component Value Date    HDL 86 12/06/2021     Lab Results   Component Value Date    LDLCALC 100 08/07/2020     Lab Results   Component Value Date    TRIG 37 12/06/2021     No components found for: [place], [place]  Lab Results   Component Value Date    WBC 5.2 12/06/2021    HGB 14.9 12/06/2021    HCT 45.2 12/06/2021    MCV 93.2 12/06/2021     12/06/2021     No results found for: INR, PROTIME  Lab Results   Component Value Date    GLUCOSE 97 12/06/2021    CREATININE 0.87 12/06/2021    BUN 16 12/06/2021     12/06/2021    K 4.1 12/06/2021     12/06/2021    CO2 23 12/06/2021     Lab Results   Component Value Date    ALT 20 12/06/2021    AST 25 12/06/2021    ALKPHOS 87 12/06/2021    BILITOT 0.51 12/06/2021     Lab Results   Component Value Date    LABPROT 7.1 03/21/2013    LABALBU 4.3 12/06/2021     Lab Results   Component Value Date    TSH 1.55 12/06/2021     Assessment:  1. High cholesterol    2. Acquired hypothyroidism    3.  Right hip pain        Plan:  Patient's LDL is 150 is 9 compared to 88 last time and all her other profiles in the last few years were within reasonable limits so will repeat lab work to evaluate for cholesterol and rule out lab error and patient's HDL is 86   Patient's hip pain is improved and taking meloxicam and advised patient to avoid meloxicam if her pain is improved  Patient's last TSH is 1.55 and continue current dose of Synthroid and following with endocrinology  We did talk about the aortic aneurysm as patient's aunt has a history and patient had no bruits and no evidence of any thrills and normal abdominal examination and discussed about doing a CT scan or ultrasound or monitor at this time as patient had no evidence and patient is not a smoker and we agreed to wait and not do any testing  Review in 6 months           1. Javier Quesada received counseling on the following healthy behaviors: nutrition and exercise    2. Prior labs and health maintenance reviewed. 3.  Discussed use, benefit, and side effects of prescribed medications. Barriers to medication compliance addressed. All her questions were answered. Pt voiced understanding. Javier Quesada will continue current medications, diet and exercise. No orders of the defined types were placed in this encounter. Completed Refills               Requested Prescriptions      No prescriptions requested or ordered in this encounter     4. Patient given educational materials - see patient instructions    5. Was a self-tracking handout given in paper form or via [employer]? NO    Orders Placed This Encounter   Procedures    Lipid Panel     Standing Status:   Future     Standing Expiration Date:   1/27/2023     Order Specific Question:   Is Patient Fasting?/# of Hours     Answer:   12     Return in about 6 months (around 7/27/2022). Patient voiced understanding and agreed to treatment plan. Electronically signed by Shelley Reid MD on 1/27/2022 at 2:57 PM    This note is created with a voice recognition program and while intend to generate a document that accurately reflects the content of the visit, no guarantee can be provided that every mistake has been identified and corrected by editing.

## 2022-02-07 DIAGNOSIS — M25.351 HIP INSTABILITY, RIGHT: ICD-10-CM

## 2022-02-23 ENCOUNTER — TELEPHONE (OUTPATIENT)
Dept: INTERNAL MEDICINE CLINIC | Age: 62
End: 2022-02-23

## 2022-02-23 DIAGNOSIS — J32.9 SINUSITIS, UNSPECIFIED CHRONICITY, UNSPECIFIED LOCATION: Primary | ICD-10-CM

## 2022-02-23 RX ORDER — AMOXICILLIN AND CLAVULANATE POTASSIUM 875; 125 MG/1; MG/1
1 TABLET, FILM COATED ORAL 2 TIMES DAILY
Qty: 20 TABLET | Refills: 0 | Status: SHIPPED | OUTPATIENT
Start: 2022-02-23 | End: 2022-03-05

## 2022-02-23 NOTE — TELEPHONE ENCOUNTER
Patient thinks she has a sinus infection x's 4 days    Has pain in her teeth, jaw is swollen    Is asking for something to be called in?     Please advise

## 2022-03-02 ENCOUNTER — TELEPHONE (OUTPATIENT)
Dept: INTERNAL MEDICINE CLINIC | Age: 62
End: 2022-03-02

## 2022-03-02 DIAGNOSIS — J32.9 CHRONIC SINUSITIS, UNSPECIFIED LOCATION: Primary | ICD-10-CM

## 2022-03-02 NOTE — TELEPHONE ENCOUNTER
Patient is still having some pain in her tooth, the dentist took x-rays says that her sinus is close to the tooth but says not sure if its a sinus infection or not. They told her to wait about 2 weeks and see if she feels better. Patient states she doesn't want to wait for 2 more weeks    She is asking you if there is a way to know if she has a sinus infection or not?     Please advise

## 2022-03-10 ENCOUNTER — TELEPHONE (OUTPATIENT)
Dept: INTERNAL MEDICINE CLINIC | Age: 62
End: 2022-03-10

## 2022-03-10 NOTE — TELEPHONE ENCOUNTER
Patient is going out of town tomorrow evening. Is there any where that I can over book her tomorrow morning?     Please advise

## 2022-03-10 NOTE — TELEPHONE ENCOUNTER
----- Message from [facility] sent at 3/9/2022  5:00 PM EST -----  Subject: Message to Provider    QUESTIONS  Information for Provider? [facility] is requesting information from PCP. Delores Gerardo PCP   needs to do peer to peer review for information. .. please contact   [phone] case # [id number]. . waiting on more information for   approval... please call and discuss so imagaing will be approved for sinus   cavity. .. call and confirm with patient this was done   ---------------------------------------------------------------------------  --------------  [address]  What is the best way for the office to contact you? OK to leave message on   voicemail  Preferred Call Back Phone Number? [phone]  ---------------------------------------------------------------------------  --------------  SCRIPT ANSWERS  Relationship to Patient?  Self

## 2022-03-11 ENCOUNTER — OFFICE VISIT (OUTPATIENT)
Dept: INTERNAL MEDICINE CLINIC | Age: 62
End: 2022-03-11
Payer: COMMERCIAL

## 2022-03-11 VITALS
DIASTOLIC BLOOD PRESSURE: 78 MMHG | RESPIRATION RATE: 16 BRPM | OXYGEN SATURATION: 98 % | HEART RATE: 84 BPM | WEIGHT: 144.6 LBS | TEMPERATURE: 97.2 F | HEIGHT: 62 IN | SYSTOLIC BLOOD PRESSURE: 130 MMHG | BODY MASS INDEX: 26.61 KG/M2

## 2022-03-11 DIAGNOSIS — J32.9 SINUSITIS, UNSPECIFIED CHRONICITY, UNSPECIFIED LOCATION: Primary | ICD-10-CM

## 2022-03-11 PROCEDURE — 99214 OFFICE O/P EST MOD 30 MIN: CPT | Performed by: INTERNAL MEDICINE

## 2022-03-11 RX ORDER — VIT C/B6/B5/MAGNESIUM/HERB 173 50-5-6-5MG
1000 CAPSULE ORAL DAILY
COMMUNITY

## 2022-03-11 ASSESSMENT — PATIENT HEALTH QUESTIONNAIRE - PHQ9
SUM OF ALL RESPONSES TO PHQ QUESTIONS 1-9: 0
1. LITTLE INTEREST OR PLEASURE IN DOING THINGS: 0
SUM OF ALL RESPONSES TO PHQ QUESTIONS 1-9: 0
SUM OF ALL RESPONSES TO PHQ QUESTIONS 1-9: 0
2. FEELING DOWN, DEPRESSED OR HOPELESS: 0
SUM OF ALL RESPONSES TO PHQ9 QUESTIONS 1 & 2: 0
SUM OF ALL RESPONSES TO PHQ QUESTIONS 1-9: 0

## 2022-03-11 NOTE — PROGRESS NOTES
Yahir Rollins is a 62 y.o. 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  Health Maintenance     hep c, hiv, tdap, cer, shing. and follow up of chronic medical problems. Patient Active Problem List   Diagnosis    MVP (mitral valve prolapse)    Lupus (HCC)    Irregular menses     HPI  Here for follow-up on sinus problems and states she still has dental pain and also sinus pain on the right side    Current Outpatient Medications   Medication Sig Dispense Refill    turmeric 500 MG CAPS Take 1,000 mg by mouth daily      fluticasone (FLONASE) 50 MCG/ACT nasal spray USE 2 SPRAYS NASALLY DAILY 48 g 1    levothyroxine (SYNTHROID) 25 MCG tablet       calcium citrate-vitamin D (CITRICAL + D) 315-250 MG-UNIT TABS per tablet Take 2 tablets by mouth 2 times daily (with meals)      Probiotic Product (PROBIOTIC-10 PO) Take 1 tablet by mouth daily      Loratadine-Pseudoephedrine (CLARITIN-D 12 HOUR PO) Take by mouth daily      FISH OIL Take  by mouth daily.  meloxicam (MOBIC) 15 MG tablet Take 1 tablet by mouth daily (Patient not taking: Reported on 3/11/2022) 30 tablet 3    tiZANidine (ZANAFLEX) 4 MG tablet Take 1 tablet by mouth 2 times daily (Patient not taking: Reported on 3/11/2022) 20 tablet 0     No current facility-administered medications for this visit.        No Known Allergies    Past Medical History:   Diagnosis Date    Irregular menses     Lupus (HCC)     MVP (mitral valve prolapse)     Seasonal allergies        Past Surgical History:   Procedure Laterality Date    COLONOSCOPY      TUBAL LIGATION         Family History   Problem Relation Age of Onset    High Blood Pressure Father     Heart Disease Other     Cancer Other     Stroke Other     Other Neg Hx         blood clots     ROS   Constitutional:  Negative for fatigue, loss of appetite and unexpected weight edema or tenderness bilaterally  No lymphadenopathy  Neurological:  alert, oriented, and normal reflexes bilaterally  Skin: warm and dry  Psychiatric:  normal mood and effect; behavior normal.    Labs:   Lab Results   Component Value Date    LABA1C 5.3 08/06/2018     Lab Results   Component Value Date    CHOL 249 (H) 12/06/2021     Lab Results   Component Value Date    HDL 86 12/06/2021     Lab Results   Component Value Date    LDLCALC 100 08/07/2020     Lab Results   Component Value Date    TRIG 37 12/06/2021     No components found for: [place], [place]  Lab Results   Component Value Date    WBC 5.2 12/06/2021    HGB 14.9 12/06/2021    HCT 45.2 12/06/2021    MCV 93.2 12/06/2021     12/06/2021     No results found for: INR, PROTIME  Lab Results   Component Value Date    GLUCOSE 97 12/06/2021    CREATININE 0.87 12/06/2021    BUN 16 12/06/2021     12/06/2021    K 4.1 12/06/2021     12/06/2021    CO2 23 12/06/2021     Lab Results   Component Value Date    ALT 20 12/06/2021    AST 25 12/06/2021    ALKPHOS 87 12/06/2021    BILITOT 0.51 12/06/2021     Lab Results   Component Value Date    LABPROT 7.1 03/21/2013    LABALBU 4.3 12/06/2021     Lab Results   Component Value Date    TSH 1.55 12/06/2021     Assessment:  1. Sinusitis, unspecified chronicity, unspecified location        Plan:  Patient is on antibiotics still having some symptoms and have tenderness on the right maxillary sinus and patient was treated for 10 days with Augmentin on 2/23/2022 and patient has seen the dentist who thought it was the sinus problem rather than a dental problem and they did some x-rays of the teeth and sinuses and we did order a CT scan but was not approved by the insurance  I did call the insurance company and after 40 minutes and talking to 4 different people at 4 different stations finally it was approved and patient was informed to schedule the CAT scan and the authorization number given [id number]           1.   Frederick Ying received counseling on the following healthy behaviors: nutrition and exercise    2. Prior labs and health maintenance reviewed. 3.  Discussed use, benefit, and side effects of prescribed medications. Barriers to medication compliance addressed. All her questions were answered. Pt voiced understanding. Veronica Allen will continue current medications, diet and exercise. No orders of the defined types were placed in this encounter. Completed Refills               Requested Prescriptions      No prescriptions requested or ordered in this encounter     4. Patient given educational materials - see patient instructions    5. Was a self-tracking handout given in paper form or via [employer]? NO    No orders of the defined types were placed in this encounter. No follow-ups on file. Patient voiced understanding and agreed to treatment plan. Electronically signed by Estrella Au MD on 3/11/2022 at 1:16 PM    This note is created with a voice recognition program and while intend to generate a document that accurately reflects the content of the visit, no guarantee can be provided that every mistake has been identified and corrected by editing.

## 2022-03-14 ENCOUNTER — HOSPITAL ENCOUNTER (OUTPATIENT)
Dept: CT IMAGING | Facility: CLINIC | Age: 62
Discharge: HOME | End: 2022-03-16
Payer: COMMERCIAL

## 2022-03-14 DIAGNOSIS — J32.9 SINUSITIS, UNSPECIFIED CHRONICITY, UNSPECIFIED LOCATION: ICD-10-CM

## 2022-03-14 PROCEDURE — 70486 CT MAXILLOFACIAL W/O DYE: CPT

## 2022-03-23 ENCOUNTER — PATIENT MESSAGE (OUTPATIENT)
Dept: INTERNAL MEDICINE CLINIC | Age: 62
End: 2022-03-23

## 2022-03-23 NOTE — TELEPHONE ENCOUNTER
From: Ori Postal  To: Dr. Krishna Austin: 3/23/2022 10:42 AM EDT  Subject: Thank you! I just wanted to thank Dr Sidney Virgen and all the staff members that helped me with my Is it pain from a tooth or sinus?  problem during the past 5+ weeks. I wanted to advise that, after being told the sinus CT scan showed no sinusitis, the dentist did find that the tooth was actually cracked. After 4 visits, I now have a permanent crown on the tooth and no more pain (there is a weird taste in my mouth, but were hoping that goes away by rinsing with mouthwash for the next week-fingers-crossed!). I dont think, in general, people are thankful enough. I just wanted to be sure to thank you all for going the extra mile to help me out!     Ivon Harmon

## 2022-04-04 RX ORDER — FLUTICASONE PROPIONATE 50 MCG
SPRAY, SUSPENSION (ML) NASAL
Qty: 48 G | Refills: 1 | Status: SHIPPED | OUTPATIENT
Start: 2022-04-04 | End: 2022-08-03 | Stop reason: SDUPTHER

## 2022-04-04 NOTE — TELEPHONE ENCOUNTER
Sophia Tee is calling to request a refill on the following medication(s):    Medication Request:  Requested Prescriptions     Pending Prescriptions Disp Refills    fluticasone (FLONASE) 50 MCG/ACT nasal spray [Pharmacy Med Name: FLUTICASONE  SPR 50MCG RX] 48 g 1     Sig: USE 2 SPRAYS NASALLY DAILY     Last Fill: 10/19/21  Last Visit Date (If Applicable):  0/58/6831    Next Visit Date:    7/28/2022

## 2022-08-01 ENCOUNTER — TELEPHONE (OUTPATIENT)
Dept: INTERNAL MEDICINE CLINIC | Age: 62
End: 2022-08-01

## 2022-08-01 NOTE — TELEPHONE ENCOUNTER
Pt called in states she tested positive for Covid this morning. Said she head about a antiviral drug being given to people with covid? Wants to know if she should get it. Please advise.

## 2022-08-03 ENCOUNTER — TELEMEDICINE (OUTPATIENT)
Dept: INTERNAL MEDICINE CLINIC | Age: 62
End: 2022-08-03
Payer: COMMERCIAL

## 2022-08-03 DIAGNOSIS — R21 RASH: ICD-10-CM

## 2022-08-03 DIAGNOSIS — U07.1 COVID-19: Primary | ICD-10-CM

## 2022-08-03 DIAGNOSIS — J32.9 SINUSITIS, UNSPECIFIED CHRONICITY, UNSPECIFIED LOCATION: ICD-10-CM

## 2022-08-03 PROCEDURE — 99443 PR PHYS/QHP TELEPHONE EVALUATION 21-30 MIN: CPT | Performed by: INTERNAL MEDICINE

## 2022-08-03 PROCEDURE — G0444 DEPRESSION SCREEN ANNUAL: HCPCS | Performed by: INTERNAL MEDICINE

## 2022-08-03 RX ORDER — FLUTICASONE PROPIONATE 50 MCG
SPRAY, SUSPENSION (ML) NASAL
Qty: 48 G | Refills: 1 | Status: SHIPPED | OUTPATIENT
Start: 2022-08-03

## 2022-08-03 SDOH — ECONOMIC STABILITY: FOOD INSECURITY: WITHIN THE PAST 12 MONTHS, YOU WORRIED THAT YOUR FOOD WOULD RUN OUT BEFORE YOU GOT MONEY TO BUY MORE.: NEVER TRUE

## 2022-08-03 SDOH — ECONOMIC STABILITY: FOOD INSECURITY: WITHIN THE PAST 12 MONTHS, THE FOOD YOU BOUGHT JUST DIDN'T LAST AND YOU DIDN'T HAVE MONEY TO GET MORE.: NEVER TRUE

## 2022-08-03 ASSESSMENT — PATIENT HEALTH QUESTIONNAIRE - PHQ9
SUM OF ALL RESPONSES TO PHQ9 QUESTIONS 1 & 2: 0
2. FEELING DOWN, DEPRESSED OR HOPELESS: 0
SUM OF ALL RESPONSES TO PHQ QUESTIONS 1-9: 0
SUM OF ALL RESPONSES TO PHQ QUESTIONS 1-9: 0
1. LITTLE INTEREST OR PLEASURE IN DOING THINGS: 0
SUM OF ALL RESPONSES TO PHQ QUESTIONS 1-9: 0
SUM OF ALL RESPONSES TO PHQ QUESTIONS 1-9: 0

## 2022-08-03 ASSESSMENT — SOCIAL DETERMINANTS OF HEALTH (SDOH): HOW HARD IS IT FOR YOU TO PAY FOR THE VERY BASICS LIKE FOOD, HOUSING, MEDICAL CARE, AND HEATING?: NOT HARD AT ALL

## 2022-08-15 DIAGNOSIS — J32.9 SINUSITIS, UNSPECIFIED CHRONICITY, UNSPECIFIED LOCATION: Primary | ICD-10-CM

## 2022-08-15 NOTE — TELEPHONE ENCOUNTER
Patient calling and states since she covid she has got a sinus infection , HA, pressure behind left eye, post nasal drip asking for medication

## 2022-08-16 RX ORDER — AZITHROMYCIN 500 MG/1
500 TABLET, FILM COATED ORAL DAILY
Qty: 1 PACKET | Refills: 0 | Status: SHIPPED | OUTPATIENT
Start: 2022-08-16 | End: 2022-08-19

## 2022-08-19 ENCOUNTER — HOSPITAL ENCOUNTER (OUTPATIENT)
Age: 62
Setting detail: SPECIMEN
Discharge: HOME | End: 2022-08-19

## 2022-08-19 DIAGNOSIS — E78.00 HIGH CHOLESTEROL: ICD-10-CM

## 2022-08-19 LAB
CHOLESTEROL/HDL RATIO: 2.9
CHOLESTEROL: 226 MG/DL
HDLC SERPL-MCNC: 79 MG/DL
LDL CHOLESTEROL: 137 MG/DL (ref 0–130)
TRIGL SERPL-MCNC: 52 MG/DL

## 2022-08-31 ENCOUNTER — TELEMEDICINE (OUTPATIENT)
Dept: INTERNAL MEDICINE CLINIC | Age: 62
End: 2022-08-31
Payer: COMMERCIAL

## 2022-08-31 DIAGNOSIS — J32.9 SINUSITIS, UNSPECIFIED CHRONICITY, UNSPECIFIED LOCATION: Primary | ICD-10-CM

## 2022-08-31 PROCEDURE — 99443 PR PHYS/QHP TELEPHONE EVALUATION 21-30 MIN: CPT | Performed by: INTERNAL MEDICINE

## 2022-08-31 RX ORDER — CEFUROXIME AXETIL 500 MG/1
500 TABLET ORAL 2 TIMES DAILY
Qty: 20 TABLET | Refills: 0 | Status: SHIPPED | OUTPATIENT
Start: 2022-08-31 | End: 2022-09-10

## 2022-08-31 RX ORDER — PREDNISONE 10 MG/1
10 TABLET ORAL
Qty: 15 TABLET | Refills: 0 | Status: SHIPPED | OUTPATIENT
Start: 2022-08-31 | End: 2022-09-05

## 2022-08-31 ASSESSMENT — PATIENT HEALTH QUESTIONNAIRE - PHQ9
2. FEELING DOWN, DEPRESSED OR HOPELESS: 0
SUM OF ALL RESPONSES TO PHQ QUESTIONS 1-9: 0
SUM OF ALL RESPONSES TO PHQ QUESTIONS 1-9: 0
SUM OF ALL RESPONSES TO PHQ9 QUESTIONS 1 & 2: 0
SUM OF ALL RESPONSES TO PHQ QUESTIONS 1-9: 0
SUM OF ALL RESPONSES TO PHQ QUESTIONS 1-9: 0
1. LITTLE INTEREST OR PLEASURE IN DOING THINGS: 0

## 2022-09-01 ENCOUNTER — HOSPITAL ENCOUNTER (OUTPATIENT)
Age: 62
Setting detail: SPECIMEN
Discharge: HOME | End: 2022-09-01

## 2022-09-01 LAB
T3 FREE: 2.1 PG/ML (ref 2.02–4.43)
THYROXINE, FREE: 1.3 NG/DL (ref 0.93–1.7)
TSH SERPL DL<=0.05 MIU/L-ACNC: 0.99 UIU/ML (ref 0.3–5)

## 2022-09-03 LAB — THYROID PEROXIDASE (TPO) AB: <4 IU/ML (ref 0–25)

## 2023-01-12 ENCOUNTER — HOSPITAL ENCOUNTER (OUTPATIENT)
Age: 63
Setting detail: SPECIMEN
Discharge: HOME | End: 2023-01-12

## 2023-01-12 LAB
THYROXINE, FREE: 1.52 NG/DL (ref 0.93–1.7)
TSH SERPL DL<=0.05 MIU/L-ACNC: 2.02 UIU/ML (ref 0.3–5)

## 2023-02-06 ENCOUNTER — OFFICE VISIT (OUTPATIENT)
Dept: INTERNAL MEDICINE CLINIC | Age: 63
End: 2023-02-06
Payer: COMMERCIAL

## 2023-02-06 VITALS
SYSTOLIC BLOOD PRESSURE: 118 MMHG | WEIGHT: 146.2 LBS | BODY MASS INDEX: 26.91 KG/M2 | OXYGEN SATURATION: 98 % | HEART RATE: 58 BPM | HEIGHT: 62 IN | TEMPERATURE: 97.2 F | DIASTOLIC BLOOD PRESSURE: 60 MMHG | RESPIRATION RATE: 18 BRPM

## 2023-02-06 DIAGNOSIS — Z91.09 ENVIRONMENTAL ALLERGIES: ICD-10-CM

## 2023-02-06 DIAGNOSIS — E03.9 ACQUIRED HYPOTHYROIDISM: ICD-10-CM

## 2023-02-06 DIAGNOSIS — E78.00 HIGH CHOLESTEROL: Primary | ICD-10-CM

## 2023-02-06 PROCEDURE — 99214 OFFICE O/P EST MOD 30 MIN: CPT | Performed by: INTERNAL MEDICINE

## 2023-02-06 RX ORDER — FLUTICASONE PROPIONATE 50 MCG
SPRAY, SUSPENSION (ML) NASAL
Qty: 48 G | Refills: 1 | Status: SHIPPED | OUTPATIENT
Start: 2023-02-06

## 2023-02-06 SDOH — ECONOMIC STABILITY: FOOD INSECURITY: WITHIN THE PAST 12 MONTHS, YOU WORRIED THAT YOUR FOOD WOULD RUN OUT BEFORE YOU GOT MONEY TO BUY MORE.: NEVER TRUE

## 2023-02-06 SDOH — ECONOMIC STABILITY: INCOME INSECURITY: HOW HARD IS IT FOR YOU TO PAY FOR THE VERY BASICS LIKE FOOD, HOUSING, MEDICAL CARE, AND HEATING?: NOT HARD AT ALL

## 2023-02-06 SDOH — ECONOMIC STABILITY: HOUSING INSECURITY
IN THE LAST 12 MONTHS, WAS THERE A TIME WHEN YOU DID NOT HAVE A STEADY PLACE TO SLEEP OR SLEPT IN A SHELTER (INCLUDING NOW)?: NO

## 2023-02-06 SDOH — ECONOMIC STABILITY: FOOD INSECURITY: WITHIN THE PAST 12 MONTHS, THE FOOD YOU BOUGHT JUST DIDN'T LAST AND YOU DIDN'T HAVE MONEY TO GET MORE.: NEVER TRUE

## 2023-02-06 ASSESSMENT — PATIENT HEALTH QUESTIONNAIRE - PHQ9
SUM OF ALL RESPONSES TO PHQ QUESTIONS 1-9: 0
SUM OF ALL RESPONSES TO PHQ QUESTIONS 1-9: 0
SUM OF ALL RESPONSES TO PHQ9 QUESTIONS 1 & 2: 0
SUM OF ALL RESPONSES TO PHQ QUESTIONS 1-9: 0
2. FEELING DOWN, DEPRESSED OR HOPELESS: 0
SUM OF ALL RESPONSES TO PHQ QUESTIONS 1-9: 0
1. LITTLE INTEREST OR PLEASURE IN DOING THINGS: 0

## 2023-02-06 NOTE — PROGRESS NOTES
Italia Johnston is a 65 y.o. female who presents for   Chief Complaint   Patient presents with    Health Maintenance     HIV screen, hep c, dtap, cervical screen, shingles, covid     Follow-up     6 month follow up ; pt states no concerns; refill pending    and follow up of chronic medical problems. Patient Active Problem List   Diagnosis    MVP (mitral valve prolapse)    Lupus (HCC)    Irregular menses     HPI  Here for follow-up on cholesterol denies any new complaints    Current Outpatient Medications   Medication Sig Dispense Refill    fluticasone (FLONASE) 50 MCG/ACT nasal spray USE 2 SPRAYS NASALLY DAILY 48 g 1    turmeric 500 MG CAPS Take 1,000 mg by mouth daily      levothyroxine (SYNTHROID) 25 MCG tablet       calcium citrate-vitamin D (CITRICAL + D) 315-250 MG-UNIT TABS per tablet Take 2 tablets by mouth 2 times daily (with meals)      Probiotic Product (PROBIOTIC-10 PO) Take 1 tablet by mouth daily      Loratadine-Pseudoephedrine (CLARITIN-D 12 HOUR PO) Take by mouth daily      FISH OIL Take  by mouth daily. No current facility-administered medications for this visit.        No Known Allergies    Past Medical History:   Diagnosis Date    Irregular menses     Lupus ([address]     MVP (mitral valve prolapse)     Seasonal allergies        Past Surgical History:   Procedure Laterality Date    COLONOSCOPY      TUBAL LIGATION         Family History   Problem Relation Age of Onset    High Blood Pressure Father     Heart Disease Other     Cancer Other     Stroke Other     Other Neg Hx         blood clots     ROS  Constitutional:  Negative for fatigue, loss of appetite and unexpected weight change  HEENT            : Negative for neck stiffness and pain, no congestion or sinus pressure  Eyes                : No visual disturbance or pain  Cardiovascular: No chest pain or palpitations or leg swelling  Respiratory      : Negative for cough, shortness of breath or wheezing  Gastrointestinal: Negative for abdominal pain, constipation or diarrhea and bloating No nausea or vomiting  Genitourinary:     No urgency or frequency, no burning or hematuria  Musculoskeletal: No arthralgias, back pain or myalgias  Skin                  : Negative for rash or erythema  Neurological    : Negative for dizziness, weakness, tremors ,light headedness or syncope  Psychiatric       : Negative for dysphoric mood, sleep disturbances, nervous or anxious, or decreased concentration  All other review of systems was negative    Objective  Physical Examination:    Nursing note reviewed    /60 (Site: Right Upper Arm, Position: Sitting, Cuff Size: Medium Adult)   Pulse 58   Temp 97.2 °F (36.2 °C) (Temporal)   Resp 18   Ht 5' 2\" (1.575 m)   Wt 146 lb 3.2 oz (66.3 kg)   SpO2 98%   BMI 26.74 kg/m²   BP Readings from Last 3 Encounters:   02/06/23 118/60   03/11/22 130/78   01/27/22 120/74         Constitutional:  Rylan John is oriented to place, person and time ,appears well-developed and well-nourished  HEENT:  Atraumatic and normocephalic, external ears normal bilaterally, nose normal no oropharyngeal exudate and is clear and moist  Eyes:  EOCM normal; conjunctivae normal; PERRLA bilaterally  Neck:  Normal range of motion, neck supple, no JVD and no thyromegaly  Cardiovascular:  RRR, normal heart sounds and intact distal pulses  Pulmonary:  effort normal and breath sounds normal bilaterally,no wheezes or rales, no respiratory distress  Abdominal:  Soft, non-tender; normal bowel sounds, no masses  Musculoskeletal:  Normal range of motion and no edema or tenderness bilaterally  No lymphadenopathy  Neurological:  alert, oriented, and normal reflexes bilaterally  Skin: warm and dry  Psychiatric:  normal mood and effect; behavior normal.    Labs:   Lab Results   Component Value Date    LABA1C 5.3 08/06/2018     Lab Results   Component Value Date    CHOL 226 (H) 08/19/2022     Lab Results   Component Value Date    HDL 79 08/19/2022     Lab Results Component Value Date    LDLCALC 100 08/07/2020     Lab Results   Component Value Date    TRIG 52 08/19/2022     No results found for: [place], [place]  Lab Results   Component Value Date    WBC 5.2 12/06/2021    HGB 14.9 12/06/2021    HCT 45.2 12/06/2021    MCV 93.2 12/06/2021     12/06/2021     No results found for: INR, PROTIME  Lab Results   Component Value Date    GLUCOSE 97 12/06/2021    CREATININE 0.87 12/06/2021    BUN 16 12/06/2021     12/06/2021    K 4.1 12/06/2021     12/06/2021    CO2 23 12/06/2021     Lab Results   Component Value Date    ALT 20 12/06/2021    AST 25 12/06/2021    ALKPHOS 87 12/06/2021    BILITOT 0.51 12/06/2021     Lab Results   Component Value Date    LABPROT 7.1 03/21/2013    LABALBU 4.3 12/06/2021     Lab Results   Component Value Date    TSH 2.02 01/12/2023     Assessment:  1. High cholesterol    2. Acquired hypothyroidism    3. Environmental allergies        Plan:  Patient's last LDL was 137 compared to 156 the previous time and not on any medications and patient watching diet and exercising regularly and will repeat lab work to ensure it is improving  Patient's recent TSH was within normal limits and patient has followed up with endocrinology and no changes were made and currently on 25 mcg of Synthroid  Discussed about shingles vaccine  Continue Flonase nasal spray for allergies and is doing well  Review in 6 months           1. Sunni Villavicencio received counseling on the following healthy behaviors: nutrition and exercise    2. Prior labs and health maintenance reviewed. 3.  Discussed use, benefit, and side effects of prescribed medications. Barriers to medication compliance addressed. All her questions were answered. Pt voiced understanding. Sunni Villavicencio will continue current medications, diet and exercise.               Orders Placed This Encounter   Medications    fluticasone (FLONASE) 50 MCG/ACT nasal spray     Sig: USE 2 SPRAYS NASALLY DAILY     Dispense:  48 g Refill:  1          Completed Refills               Requested Prescriptions     Signed Prescriptions Disp Refills    fluticasone (FLONASE) 50 MCG/ACT nasal spray 48 g 1     Sig: USE 2 SPRAYS NASALLY DAILY     4. Patient given educational materials - see patient instructions    5. Was a self-tracking handout given in paper form or via [employer]? NO    Orders Placed This Encounter   Procedures    Comprehensive Metabolic Panel     Standing Status:   Future     Standing Expiration Date:   2/6/2024    Lipid Panel     Standing Status:   Future     Standing Expiration Date:   2/6/2024     Order Specific Question:   Is Patient Fasting?/# of Hours     Answer:   12    CBC     Standing Status:   Future     Standing Expiration Date:   2/6/2024    Magnesium     Standing Status:   Future     Standing Expiration Date:   2/6/2024    Vitamin B12     Standing Status:   Future     Standing Expiration Date:   2/6/2024    Vitamin D 25 Hydroxy     Standing Status:   Future     Standing Expiration Date:   2/6/2024     Return in about 6 months (around 8/6/2023). Patient voiced understanding and agreed to treatment plan. Electronically signed by Rebecca Booth MD on 2/6/2023 at 1:03 PM    This note is created with a voice recognition program and while intend to generate a document that accurately reflects the content of the visit, no guarantee can be provided that every mistake has been identified and corrected by editing.

## 2023-02-10 ENCOUNTER — HOSPITAL ENCOUNTER (OUTPATIENT)
Age: 63
Setting detail: SPECIMEN
Discharge: HOME | End: 2023-02-10

## 2023-02-10 DIAGNOSIS — E78.00 HIGH CHOLESTEROL: ICD-10-CM

## 2023-02-10 LAB
25(OH)D3 SERPL-MCNC: 45 NG/ML
ALBUMIN SERPL-MCNC: 4.2 G/DL (ref 3.5–5.2)
ALBUMIN/GLOBULIN RATIO: 1.5 (ref 1–2.5)
ALP SERPL-CCNC: 90 U/L (ref 35–104)
ALT SERPL-CCNC: 15 U/L (ref 5–33)
ANION GAP SERPL CALCULATED.3IONS-SCNC: 13 MMOL/L (ref 9–17)
AST SERPL-CCNC: 22 U/L
BILIRUB SERPL-MCNC: 0.7 MG/DL (ref 0.3–1.2)
BUN SERPL-MCNC: 15 MG/DL (ref 8–23)
CALCIUM SERPL-MCNC: 9.4 MG/DL (ref 8.6–10.4)
CHLORIDE SERPL-SCNC: 107 MMOL/L (ref 98–107)
CHOLEST SERPL-MCNC: 218 MG/DL
CHOLESTEROL/HDL RATIO: 2.7
CO2 SERPL-SCNC: 24 MMOL/L (ref 20–31)
CREAT SERPL-MCNC: 0.88 MG/DL (ref 0.5–0.9)
GFR SERPL CREATININE-BSD FRML MDRD: >60 ML/MIN/1.73M2
GLUCOSE SERPL-MCNC: 93 MG/DL (ref 70–99)
HCT VFR BLD AUTO: 43.7 % (ref 36.3–47.1)
HDLC SERPL-MCNC: 81 MG/DL
HGB BLD-MCNC: 14.6 G/DL (ref 11.9–15.1)
LDLC SERPL CALC-MCNC: 126 MG/DL (ref 0–130)
MAGNESIUM SERPL-MCNC: 2.2 MG/DL (ref 1.6–2.6)
MCH RBC QN AUTO: 30.9 PG (ref 25.2–33.5)
MCHC RBC AUTO-ENTMCNC: 33.4 G/DL (ref 28.4–34.8)
MCV RBC AUTO: 92.4 FL (ref 82.6–102.9)
NRBC AUTOMATED: 0 PER 100 WBC
PDW BLD-RTO: 12 % (ref 11.8–14.4)
PLATELET # BLD AUTO: 269 K/UL (ref 138–453)
PMV BLD AUTO: 11 FL (ref 8.1–13.5)
POTASSIUM SERPL-SCNC: 4.7 MMOL/L (ref 3.7–5.3)
PROT SERPL-MCNC: 7 G/DL (ref 6.4–8.3)
RBC # BLD: 4.73 M/UL (ref 3.95–5.11)
SODIUM SERPL-SCNC: 144 MMOL/L (ref 135–144)
TRIGL SERPL-MCNC: 55 MG/DL
VIT B12 SERPL-MCNC: 561 PG/ML (ref 232–1245)
WBC # BLD AUTO: 2.7 K/UL (ref 3.5–11.3)

## 2023-02-20 ENCOUNTER — TELEPHONE (OUTPATIENT)
Dept: INTERNAL MEDICINE CLINIC | Age: 63
End: 2023-02-20

## 2023-02-20 DIAGNOSIS — R79.89 ABNORMAL CBC: Primary | ICD-10-CM

## 2023-02-20 NOTE — TELEPHONE ENCOUNTER
----- Message from Delmi Smith MD sent at 2/16/2023  1:36 PM EST -----  All labs are reasonable and her white count was slightly on the low side which was fluctuating in the past  Advise to repeat CBC with differential in 4 weeks no fasting

## 2023-08-07 NOTE — TELEPHONE ENCOUNTER
Do you want the CT scan with or without contrast?    Pt states she wants to check prices with her insurance before we order the test, she will call back with what facility she would like to go to. Patient was notified

## 2023-09-07 ENCOUNTER — OFFICE VISIT (OUTPATIENT)
Dept: INTERNAL MEDICINE CLINIC | Age: 63
End: 2023-09-07

## 2023-09-07 VITALS
HEIGHT: 62 IN | WEIGHT: 143.4 LBS | DIASTOLIC BLOOD PRESSURE: 70 MMHG | SYSTOLIC BLOOD PRESSURE: 100 MMHG | RESPIRATION RATE: 20 BRPM | HEART RATE: 80 BPM | TEMPERATURE: 98.2 F | OXYGEN SATURATION: 100 % | BODY MASS INDEX: 26.39 KG/M2

## 2023-09-07 DIAGNOSIS — E78.00 HIGH CHOLESTEROL: ICD-10-CM

## 2023-09-07 DIAGNOSIS — E03.9 ACQUIRED HYPOTHYROIDISM: ICD-10-CM

## 2023-09-07 DIAGNOSIS — Z91.09 ENVIRONMENTAL ALLERGIES: Primary | ICD-10-CM

## 2023-09-07 RX ORDER — FLUTICASONE PROPIONATE 50 MCG
SPRAY, SUSPENSION (ML) NASAL
Qty: 48 G | Refills: 5 | Status: SHIPPED | OUTPATIENT
Start: 2023-09-07

## 2023-09-07 NOTE — PROGRESS NOTES
DAILY     Dispense:  48 g     Refill:  5          Completed Refills               Requested Prescriptions     Signed Prescriptions Disp Refills    fluticasone (FLONASE) 50 MCG/ACT nasal spray 48 g 5     Sig: USE 2 SPRAYS NASALLY DAILY     4. Patient given educational materials - see patient instructions    5. Was a self-tracking handout given in paper form or via [employer]? NO    No orders of the defined types were placed in this encounter. Return in about 6 months (around 3/7/2024). Patient voiced understanding and agreed to treatment plan. Electronically signed by Jojo Harrison MD on 9/7/2023 at 4:45 PM    This note is created with a voice recognition program and while intend to generate a document that accurately reflects the content of the visit, no guarantee can be provided that every mistake has been identified and corrected by editing.

## 2024-01-03 ENCOUNTER — PATIENT MESSAGE (OUTPATIENT)
Dept: INTERNAL MEDICINE CLINIC | Age: 64
End: 2024-01-03

## 2024-01-03 DIAGNOSIS — M77.9 TENDINITIS: Primary | ICD-10-CM

## 2024-01-03 NOTE — TELEPHONE ENCOUNTER
From: Rebecca Banuelos  To: Dr. Livan Durham  Sent: 1/3/2024 6:40 AM EST  Subject: Consultation appointment with someone to review overuse wrist injury?    Good morning,    A couple of weeks before Thanksgiving, it was a nice day, and I decided I was going to dig up all the grass in our flower bed. It was one of the few nice days—I needed to get it done—and I kept at it, even though my wrist was burning.    So, I have been dealing with a very sore wrist for over two months now. It doesn’t hurt for every motion, just mainly anything that uses the tendon/muscles on the left side above the thumb—some tennis strokes (and bowling) are very painful.     I have splinted it twice for one to two week periods and got it feeling better. At first, it actually burns “in a good way“ when I use it, and feels like it’s healing. I’ve gently stretched it & googled and found some wrist exercises. I ice it 3 to 4 times a day, and recently have been icing then following that with a hot pad for 15-20 minutes, which feels really good.     I’m just tired of it hurting and would like to talk to someone who can provide a concrete treatment plan. I just need to know if I’m supposed to be splinting it, or using it and pain it’s just something I need to work through, etc..)—OR that’ll just make it worse and it’ll never heal.    Maybe I can just have a PT consultation and they can suggest a treatment plan? Please let me know how you think I can proceed. I appreciate your help!    Micaela

## 2024-01-05 DIAGNOSIS — M25.531 RIGHT WRIST PAIN: Primary | ICD-10-CM

## 2024-01-08 ENCOUNTER — OFFICE VISIT (OUTPATIENT)
Dept: ORTHOPEDIC SURGERY | Age: 64
End: 2024-01-08
Payer: COMMERCIAL

## 2024-01-08 VITALS — RESPIRATION RATE: 15 BRPM | HEIGHT: 62 IN | WEIGHT: 145 LBS | BODY MASS INDEX: 26.68 KG/M2

## 2024-01-08 DIAGNOSIS — M65.4 DE QUERVAIN'S DISEASE (RADIAL STYLOID TENOSYNOVITIS): Primary | ICD-10-CM

## 2024-01-08 PROCEDURE — 99203 OFFICE O/P NEW LOW 30 MIN: CPT | Performed by: PHYSICIAN ASSISTANT

## 2024-01-08 PROCEDURE — 20550 NJX 1 TENDON SHEATH/LIGAMENT: CPT | Performed by: PHYSICIAN ASSISTANT

## 2024-01-08 RX ORDER — BETAMETHASONE SODIUM PHOSPHATE AND BETAMETHASONE ACETATE 3; 3 MG/ML; MG/ML
6 INJECTION, SUSPENSION INTRA-ARTICULAR; INTRALESIONAL; INTRAMUSCULAR; SOFT TISSUE ONCE
Status: COMPLETED | OUTPATIENT
Start: 2024-01-08 | End: 2024-01-08

## 2024-01-08 RX ORDER — BUPIVACAINE HYDROCHLORIDE 2.5 MG/ML
1 INJECTION, SOLUTION INFILTRATION; PERINEURAL ONCE
Status: COMPLETED | OUTPATIENT
Start: 2024-01-08 | End: 2024-01-08

## 2024-01-08 RX ADMIN — BETAMETHASONE SODIUM PHOSPHATE AND BETAMETHASONE ACETATE 6 MG: 3; 3 INJECTION, SUSPENSION INTRA-ARTICULAR; INTRALESIONAL; INTRAMUSCULAR; SOFT TISSUE at 14:54

## 2024-01-08 RX ADMIN — BUPIVACAINE HYDROCHLORIDE 2.5 MG: 2.5 INJECTION, SOLUTION INFILTRATION; PERINEURAL at 14:54

## 2024-01-08 ASSESSMENT — ENCOUNTER SYMPTOMS
CONSTIPATION: 0
ABDOMINAL DISTENTION: 0
SHORTNESS OF BREATH: 0
ABDOMINAL PAIN: 0
RESPIRATORY NEGATIVE: 1
NAUSEA: 0
CHEST TIGHTNESS: 0
DIARRHEA: 0
APNEA: 0
VOMITING: 0
COUGH: 0
GASTROINTESTINAL NEGATIVE: 1
COLOR CHANGE: 0

## 2024-01-08 NOTE — PROGRESS NOTES
[facility]OP[facility] MEDICINE  [address]  [facility] [place] [place] [facility] [address]  Dept: [phone]  Dept Fax: [phone]        Right Hand & Wrist   New Patient    Subjective:     Chief Complaint   Patient presents with    Wrist Pain     R Wrist Pain     HPI:     Rebecca Banuelos presents with a 2 month history of right wrist pain.  Occupation: Part-time .  The patient is right hand dominant.  The patient has not had an injury but does do heavy gardening which started her pain.  She is complaining of achy, sharp and shooting pain of the right wrist.  She has had to stop playing tennis, pickleball and yoga.  She has severe pain with movement.  She has tried a brace, heat, ice, home exercises and ibuprofen. She has seen a chiropractor.     ROS:     Review of Systems   Constitutional:  Positive for activity change. Negative for appetite change, fatigue and fever.   Respiratory: Negative.  Negative for apnea, cough, chest tightness and shortness of breath.    Cardiovascular: Negative.  Negative for chest pain, palpitations and leg swelling.   Gastrointestinal: Negative.  Negative for abdominal distention, abdominal pain, constipation, diarrhea, nausea and vomiting.   Genitourinary: Negative.  Negative for difficulty urinating, dysuria and hematuria.   Musculoskeletal:  Positive for arthralgias and joint swelling. Negative for gait problem and myalgias.   Skin: Negative.  Negative for color change and rash.   Neurological: Negative.  Negative for dizziness, weakness, numbness and headaches.   Psychiatric/Behavioral: Negative.  Negative for sleep disturbance.        Past Medical History:    Past Medical History:   Diagnosis Date    Irregular menses     Lupus (HCC)     MVP (mitral valve prolapse)     Seasonal allergies        Past Surgical History:    Past Surgical History:   Procedure Laterality Date    COLONOSCOPY

## 2024-01-08 NOTE — PATIENT INSTRUCTIONS
CORTISONE INJECTION CARE    The injection site should never get red, hot, or swollen and if it does the patient will contact our office right away. The patient may experience a increase in soreness the first 24-48 hours due to a cortisone flair and can take anti-inflammatories for a short period of time to reduce that soreness. The patient should not submerge the injection site in water for a minimum of 24 hours to avoid infection. This means no lakes, pools, ponds, or hot tubs for 24 hours. If the patient is diabetic the injection may increase their blood sugar for up to one week. The patient can do this cortisone injection once every 4 months as needed.

## 2024-01-26 ENCOUNTER — HOSPITAL ENCOUNTER (OUTPATIENT)
Dept: ULTRASOUND IMAGING | Age: 64
End: 2024-01-26
Payer: COMMERCIAL

## 2024-01-26 ENCOUNTER — HOSPITAL ENCOUNTER (OUTPATIENT)
Age: 64
Setting detail: SPECIMEN
Discharge: HOME | End: 2024-01-26

## 2024-01-26 ENCOUNTER — HOSPITAL ENCOUNTER (OUTPATIENT)
Age: 64
Discharge: HOME | End: 2024-01-26
Payer: COMMERCIAL

## 2024-01-26 DIAGNOSIS — E04.1 NONTOXIC UNINODULAR GOITER: ICD-10-CM

## 2024-01-26 PROCEDURE — 76536 US EXAM OF HEAD AND NECK: CPT

## 2024-01-27 LAB
T4 FREE SERPL-MCNC: 1.4 NG/DL (ref 0.9–1.7)
TSH SERPL DL<=0.05 MIU/L-ACNC: 1.29 UIU/ML (ref 0.3–5)

## 2024-03-14 ENCOUNTER — TELEPHONE (OUTPATIENT)
Dept: INTERNAL MEDICINE CLINIC | Age: 64
End: 2024-03-14

## 2024-03-14 NOTE — TELEPHONE ENCOUNTER
----- Message from Ashley Shahid sent at 3/14/2024  8:36 AM EDT -----  Subject: Message to Provider    QUESTIONS  Information for Provider? Patient is going out of town and needed to   cancel her appointment tried to reschedule but nothing was avabaible   please call back to assist.   ---------------------------------------------------------------------------  --------------  CALL BACK INFO  [phone]; OK to leave message on voicemail  ---------------------------------------------------------------------------  --------------  SCRIPT ANSWERS  Relationship to Patient? Self

## 2024-04-05 ENCOUNTER — OFFICE VISIT (OUTPATIENT)
Dept: ORTHOPEDIC SURGERY | Age: 64
End: 2024-04-05
Payer: COMMERCIAL

## 2024-04-05 VITALS — HEIGHT: 62 IN | BODY MASS INDEX: 27.6 KG/M2 | WEIGHT: 150 LBS | RESPIRATION RATE: 16 BRPM

## 2024-04-05 DIAGNOSIS — M65.4 DE QUERVAIN'S DISEASE (RADIAL STYLOID TENOSYNOVITIS): Primary | ICD-10-CM

## 2024-04-05 DIAGNOSIS — M25.531 RIGHT WRIST PAIN: ICD-10-CM

## 2024-04-05 PROCEDURE — 20551 NJX 1 TENDON ORIGIN/INSJ: CPT | Performed by: PHYSICIAN ASSISTANT

## 2024-04-05 PROCEDURE — 99214 OFFICE O/P EST MOD 30 MIN: CPT | Performed by: PHYSICIAN ASSISTANT

## 2024-04-05 RX ORDER — BETAMETHASONE SODIUM PHOSPHATE AND BETAMETHASONE ACETATE 3; 3 MG/ML; MG/ML
6 INJECTION, SUSPENSION INTRA-ARTICULAR; INTRALESIONAL; INTRAMUSCULAR; SOFT TISSUE ONCE
Status: COMPLETED | OUTPATIENT
Start: 2024-04-05 | End: 2024-04-05

## 2024-04-05 RX ORDER — BUPIVACAINE HYDROCHLORIDE 2.5 MG/ML
0.5 INJECTION, SOLUTION INFILTRATION; PERINEURAL ONCE
Status: COMPLETED | OUTPATIENT
Start: 2024-04-05 | End: 2024-04-05

## 2024-04-05 RX ORDER — LIDOCAINE HYDROCHLORIDE 10 MG/ML
0.5 INJECTION, SOLUTION INFILTRATION; PERINEURAL ONCE
Status: COMPLETED | OUTPATIENT
Start: 2024-04-05 | End: 2024-04-05

## 2024-04-05 RX ADMIN — LIDOCAINE HYDROCHLORIDE 0.5 ML: 10 INJECTION, SOLUTION INFILTRATION; PERINEURAL at 15:21

## 2024-04-05 RX ADMIN — BETAMETHASONE SODIUM PHOSPHATE AND BETAMETHASONE ACETATE 6 MG: 3; 3 INJECTION, SUSPENSION INTRA-ARTICULAR; INTRALESIONAL; INTRAMUSCULAR; SOFT TISSUE at 15:21

## 2024-04-05 RX ADMIN — BUPIVACAINE HYDROCHLORIDE 1.25 MG: 2.5 INJECTION, SOLUTION INFILTRATION; PERINEURAL at 15:22

## 2024-04-05 NOTE — PROGRESS NOTES
[facility] AND SPORTS MEDICINE  [address]  [place] [address]  Dept: [phone]  Dept Fax: [phone]        Ambulatory Follow Up      Subjective:   Rebecca Banuelos is a 63 y.o. year old female who presents to our office today for routine followup regarding her   1. De Quervain's disease (radial styloid tenosynovitis)    2. Right wrist pain        Chief Complaint   Patient presents with    Wrist Pain     R Wrist Pain       HPI Rebecca Banuelos  is a 63 y.o. Right hand dominant  female who presents today in follow for right wrist dequervains tenosynovitis.  The patient was last seen on 1/8/2024 with Nereida Hendrix PA-C and underwent treatment in the form of right wrist dequervains tenosynovitis injection and recommendation to obtain a thumb spica brace.   The patient notes ~6 weeks of  improvement with the previous treatment.   Patient notes that she has been unable to wear the restrictive thumb spica brace while playing sports like tennis, pickleball or doing yoga.  She notes that for the first 4 to 6 weeks her thumb and wrist discomfort did significantly improve but with increased activity recently her pain has come back.  She denies recent trauma or injury.  She denies prior surgery to the right wrist or hand.      Review of Systems   Constitutional:  Negative for activity change and fever.   HENT:  Negative for sneezing.    Respiratory:  Negative for cough and shortness of breath.    Cardiovascular:  Negative for chest pain.   Gastrointestinal:  Negative for vomiting.   Musculoskeletal:  Positive for arthralgias (Right wrist). Negative for joint swelling and myalgias.   Skin:  Negative for color change.   Neurological:  Negative for weakness and numbness.   Psychiatric/Behavioral:  Negative for sleep disturbance.          Objective :   Resp 16   Ht 1.575 m (5' 2\")   Wt 68 kg (150 lb)   BMI 27.44 kg/m²  Body

## 2024-04-08 ASSESSMENT — ENCOUNTER SYMPTOMS
COLOR CHANGE: 0
COUGH: 0
SHORTNESS OF BREATH: 0

## 2024-04-30 ENCOUNTER — TELEPHONE (OUTPATIENT)
Dept: INTERNAL MEDICINE CLINIC | Age: 64
End: 2024-04-30

## 2024-04-30 NOTE — TELEPHONE ENCOUNTER
Patient found a lump on her left side above her pelvic bone last night.    Not real painful but bothersome    Is asking to see you, she is nervous?    Please advise

## 2024-05-01 ENCOUNTER — OFFICE VISIT (OUTPATIENT)
Dept: INTERNAL MEDICINE CLINIC | Age: 64
End: 2024-05-01
Payer: COMMERCIAL

## 2024-05-01 VITALS
WEIGHT: 148.8 LBS | BODY MASS INDEX: 27.38 KG/M2 | RESPIRATION RATE: 16 BRPM | OXYGEN SATURATION: 98 % | HEIGHT: 62 IN | SYSTOLIC BLOOD PRESSURE: 108 MMHG | HEART RATE: 66 BPM | DIASTOLIC BLOOD PRESSURE: 76 MMHG | TEMPERATURE: 97.5 F

## 2024-05-01 DIAGNOSIS — R19.04 LEFT LOWER QUADRANT ABDOMINAL MASS: Primary | ICD-10-CM

## 2024-05-01 DIAGNOSIS — M32.9 LUPUS (HCC): ICD-10-CM

## 2024-05-01 PROCEDURE — 99214 OFFICE O/P EST MOD 30 MIN: CPT | Performed by: INTERNAL MEDICINE

## 2024-05-01 SDOH — ECONOMIC STABILITY: FOOD INSECURITY: WITHIN THE PAST 12 MONTHS, YOU WORRIED THAT YOUR FOOD WOULD RUN OUT BEFORE YOU GOT MONEY TO BUY MORE.: NEVER TRUE

## 2024-05-01 SDOH — ECONOMIC STABILITY: FOOD INSECURITY: WITHIN THE PAST 12 MONTHS, THE FOOD YOU BOUGHT JUST DIDN'T LAST AND YOU DIDN'T HAVE MONEY TO GET MORE.: NEVER TRUE

## 2024-05-01 SDOH — ECONOMIC STABILITY: INCOME INSECURITY: HOW HARD IS IT FOR YOU TO PAY FOR THE VERY BASICS LIKE FOOD, HOUSING, MEDICAL CARE, AND HEATING?: NOT HARD AT ALL

## 2024-05-01 ASSESSMENT — PATIENT HEALTH QUESTIONNAIRE - PHQ9
SUM OF ALL RESPONSES TO PHQ QUESTIONS 1-9: 0
SUM OF ALL RESPONSES TO PHQ QUESTIONS 1-9: 0
1. LITTLE INTEREST OR PLEASURE IN DOING THINGS: NOT AT ALL
SUM OF ALL RESPONSES TO PHQ QUESTIONS 1-9: 0
SUM OF ALL RESPONSES TO PHQ9 QUESTIONS 1 & 2: 0
2. FEELING DOWN, DEPRESSED OR HOPELESS: NOT AT ALL
SUM OF ALL RESPONSES TO PHQ QUESTIONS 1-9: 0

## 2024-05-01 NOTE — PROGRESS NOTES
Rebecca Banuelos is a 63 y.o. female who presents for   Chief Complaint   Patient presents with    Mass     Patient states found a lump on left side 2 days ago    Health Maintenance     Dep, covid, rsv, shingles, cervical, tdap, hep c, hiv    and follow up of chronic medical problems.  Patient Active Problem List   Diagnosis    MVP (mitral valve prolapse)    Lupus (HCC)    Irregular menses     HPI  Here for evaluation of a lump on the left side of the abdomen felt yesterday    Current Outpatient Medications   Medication Sig Dispense Refill    NONFORMULARY Juice plus      fluticasone (FLONASE) 50 MCG/ACT nasal spray USE 2 SPRAYS NASALLY DAILY 48 g 5    turmeric 500 MG CAPS Take 2 capsules by mouth daily      levothyroxine (SYNTHROID) 25 MCG tablet       calcium citrate-vitamin D (CITRICAL + D) 315-250 MG-UNIT TABS per tablet Take 2 tablets by mouth 2 times daily (with meals)      Probiotic Product (PROBIOTIC-10 PO) Take 1 tablet by mouth daily      Loratadine-Pseudoephedrine (CLARITIN-D 12 HOUR PO) Take by mouth daily      FISH OIL Take  by mouth daily.       No current facility-administered medications for this visit.       No Known Allergies    Past Medical History:   Diagnosis Date    Irregular menses     Lupus (HCC)     MVP (mitral valve prolapse)     Seasonal allergies        Past Surgical History:   Procedure Laterality Date    COLONOSCOPY      TUBAL LIGATION         Family History   Problem Relation Age of Onset    High Blood Pressure Father     Heart Disease Other     Cancer Other     Stroke Other     Other Neg Hx         blood clots     ROS  Constitutional:  Negative for fatigue, loss of appetite and unexpected weight change  HEENT            : Negative for neck stiffness and pain, no congestion or sinus pressure  Eyes                : No visual disturbance or pain  Cardiovascular: No chest pain or palpitations or leg swelling  Respiratory      : Negative for cough, shortness of breath or

## 2024-05-03 ENCOUNTER — TELEPHONE (OUTPATIENT)
Dept: ORTHOPEDIC SURGERY | Age: 64
End: 2024-05-03

## 2024-05-03 ENCOUNTER — OFFICE VISIT (OUTPATIENT)
Dept: ORTHOPEDIC SURGERY | Age: 64
End: 2024-05-03
Payer: COMMERCIAL

## 2024-05-03 VITALS — RESPIRATION RATE: 17 BRPM | HEIGHT: 62 IN | WEIGHT: 145 LBS | BODY MASS INDEX: 26.68 KG/M2

## 2024-05-03 DIAGNOSIS — M25.531 RIGHT WRIST PAIN: ICD-10-CM

## 2024-05-03 DIAGNOSIS — M65.4 DE QUERVAIN'S DISEASE (RADIAL STYLOID TENOSYNOVITIS): Primary | ICD-10-CM

## 2024-05-03 PROCEDURE — 99214 OFFICE O/P EST MOD 30 MIN: CPT | Performed by: PHYSICIAN ASSISTANT

## 2024-05-03 NOTE — TELEPHONE ENCOUNTER
Shaniqua from [facility] is asking for patients order to be changed from PT to OT prior to her scheduled appointment on 5/6.    Please return her call if any questions/concerns to [phone].    Thank you.

## 2024-05-03 NOTE — PROGRESS NOTES
[facility] ORTHOPEDICS AND SPORTS MEDICINE  [address]  [place] [address]  Dept: [phone]  Dept Fax: [phone]        Ambulatory Follow Up      Subjective:   Rebecca Banuelos is a 63 y.o. year old female who presents to our office today for routine followup regarding her   1. De Quervain's disease (radial styloid tenosynovitis)    2. Right wrist pain        Chief Complaint   Patient presents with    Wrist Pain     R Wrist f/u       HPI Rebecca Banuelos  is a 63 y.o. Right hand dominant  female who presents today in follow for right wrist dequervains tenosynovitis.  The patient was last seen on 4/5/2024 and underwent treatment in the form of right wrist dequervains injection, and recommendation to wear a wrist brace and limit her tennis and pickleball activity until the wrist improves.   The patient notes  improvement with the previous treatment. She denies pain within the wrist today. She notes that she has limited her activity significantly, and is concerned that when she starts playing again that her wrist pain will come back.        Review of Systems   Constitutional:  Negative for activity change and fever.   HENT:  Negative for sneezing.    Respiratory:  Negative for cough and shortness of breath.    Cardiovascular:  Negative for chest pain.   Gastrointestinal:  Negative for vomiting.   Musculoskeletal:  Negative for arthralgias, joint swelling and myalgias.   Skin:  Negative for color change.   Neurological:  Negative for weakness and numbness.   Psychiatric/Behavioral:  Negative for sleep disturbance.          Objective :   Resp 17   Ht 1.575 m (5' 2\")   Wt 65.8 kg (145 lb)   BMI 26.52 kg/m²  Body mass index is 26.52 kg/m².  General: Rebecca Banuelos is a 63 y.o. female who is alert and oriented and sitting comfortably in our office.  Ortho Exam  MS:  Inspection of the Right hand and wrist shows mild nodular-like

## 2024-05-06 ENCOUNTER — HOSPITAL ENCOUNTER (OUTPATIENT)
Dept: OCCUPATIONAL THERAPY | Facility: CLINIC | Age: 64
Discharge: HOME | End: 2024-05-06

## 2024-05-06 PROCEDURE — 9900000067 HC THERAPEUTIC EXERCISE EA 15 MINS (SELF-PAY)

## 2024-05-06 PROCEDURE — 9900000066 HC EVALUATION (SELF-PAY)

## 2024-05-06 NOTE — TELEPHONE ENCOUNTER
Left voicemail for patient and Shaniqua at [facility] to let them know the order has been changed to Occupational Therapy for her appointment later today.

## 2024-05-06 NOTE — CONSULTS
Next Treatment: soft tissue mobilization, dry needling, IASTM    Evaluation Complexity:  History (Personal factors, comorbidities) [x]  0 []  1-2 []  3+   Exam (limitations, restrictions) [x]  1-2 []  3 []  4+   Decision Making [x]  Low []  Moderate []  High   ? [x]  Low Complexity []  Moderate   Complexity []  High Complexity      Treatment Charges: Mins Units Time In/Out   Evaluation  []  High  []  Moderate  [x]  Low  20  1    [x]  Ther Exercise 15 1    []  Manual Therapy      []  Ther Activities      []  Orthotic fit/train      []  Orthotic recheck      []        Total Billable time 35 min       Time In: 1650   Time out: 1725   Electronically signed by ABEL Remy/L on 5/6/2024 at 1645    Physician Signature: _________________________ Date: _______________  By signing above or cosigning this note, I have reviewed this plan of care and certify a need for medically necessary rehabilitation services.     *PLEASE SIGN ABOVE AND FAX BACK ALL PAGES*

## 2024-05-20 ENCOUNTER — HOSPITAL ENCOUNTER (OUTPATIENT)
Dept: OCCUPATIONAL THERAPY | Facility: CLINIC | Age: 64
Discharge: HOME | End: 2024-05-20

## 2024-05-20 PROCEDURE — 9900000067 HC THERAPEUTIC EXERCISE EA 15 MINS (SELF-PAY)

## 2024-05-20 PROCEDURE — 9900000073 HC MANUAL THERAPY PER 15 MIN (SELF-PAY)

## 2024-05-20 NOTE — FLOWSHEET NOTE
[] [facility]       Occupational Therapy            1st floor       [address] [place], [place]         Phone: [phone]       Fax: [phone] [x] [facility]   [facility]  [address] [place], [place]  Phone: [phone]  Fax: [phone] [] [facility]  Outpatient Rehabilitation &  Therapy  [address]   P: [phone]  F: [phone]     Occupational Therapy Daily Treatment Note    Date:  2024  Patient Name:  Rebecca Banuelos    :  1960  MRN: [id number]  Referring Provider:   Kelsea Sands PA-C  Insurance: Self pay  Medical Diagnosis: M65.4 (ICD-10-CM) - De Quervain's disease (radial styloid tenosynovitis), M25.531 (ICD-10-CM) - Right wrist pain        Rehab Codes: pain in wrist M25.53, or pain in right finger(s) M79.644,  Onset Date: 2023               Next  Appt: 24  Visit# / total visits: ; Progress note for Medicare patient due at visit 3  Cancels/No Shows: 0/0    Subjective:    Pain:  [] Yes  [x] No Location:  N/A Pain Rating: (0-10 scale) 0/10  Pain altered Tx:  [x] No  [] Yes  Action:  Pt Comments: Pt reports she was doing really well bt then tried new HEP and had pain again but it doing well today.     Objective:  Modalities:  Precautions:  Exercises:  Exercise Reps/Time Weight/Level Comments Completed   Thumb eccentirc  3x10 yellow HEP X   Wrist eccentric  3x10 yellow HEP X   massage 3-5 mins   HEP X   Power web  3x10 yellow Flexion/extension    Flexbar  Wrist pronation  Wrist supination  Wrist ulnar deviation  Wrist radial dev 3x10 red   X   Pronated ball eccentric   2x10 500g  Green ball X   Metal gripper 2x10 35lbs  X   Resistive clips 6 each color Yellow-black  X   Other: Access Code: [email or web address]  URL: [email or web address]  Date: 2024  Prepared by: Veto Stuart      Treatment Charges: Mins Units   []  Modalities:      []  Ultrasound     []  Ther Exercise 28 2   [x]  Manual Therapy  No

## 2024-06-03 ENCOUNTER — HOSPITAL ENCOUNTER (OUTPATIENT)
Dept: OCCUPATIONAL THERAPY | Facility: CLINIC | Age: 64
Discharge: HOME | End: 2024-06-03

## 2024-06-03 PROCEDURE — 97110 THERAPEUTIC EXERCISES: CPT

## 2024-06-03 NOTE — FLOWSHEET NOTE
[] [facility]       Occupational Therapy            1st floor       [address] [place], [place]         Phone: [phone]       Fax: [phone] [x] [facility]   [facility]  [address] [place], [place]  Phone: [phone]  Fax: [phone] [] [facility]  Outpatient Rehabilitation &  Therapy  [address]   P: [phone]  F: [phone]     Occupational Therapy Daily Treatment Note    Date:  6/3/2024  Patient Name:  Rebecca Banuelos    :  1960  MRN: [id number]  Referring Provider:   Kelsea Sands PA-C  Insurance: Self pay  Medical Diagnosis: M65.4 (ICD-10-CM) - De Quervain's disease (radial styloid tenosynovitis), M25.531 (ICD-10-CM) - Right wrist pain        Rehab Codes: pain in wrist M25.53, or pain in right finger(s) M79.644,  Onset Date: 2023               Next  Appt: 24  Visit# / total visits: 3/6; Progress note for Medicare patient due at visit 3  Cancels/No Shows: 0/0    Subjective:    Pain:  [] Yes  [x] No Location:  N/A Pain Rating: (0-10 scale) 0/10  Pain altered Tx:  [x] No  [] Yes  Action:  Pt Comments: Pt reports she is doing well. She states she was able to play tennis w/out her brace and didn't notice. Tp states she is ready to be d/c'd from formal OT.     Objective:  Modalities:  Precautions:  Exercises:  Exercise Reps/Time Weight/Level Comments Completed   Thumb eccentirc  3x10 yellow HEP X   Wrist eccentric  3x10 yellow HEP X   massage 3-5 mins   HEP X   Power web  3x10 yellow Flexion/extension    Flexbar  Wrist pronation  Wrist supination  Wrist ulnar deviation  Wrist radial dev 3x10 red   X   Pronated ball eccentric   2x10 500g  Green ball X   Metal gripper 2x10 35lbs  X   Resistive clips 6 each color Yellow-black  X   Other: Access Code: [email or web address]  URL: [email or web address]  Date: 2024  Prepared by: Veto Stuart      Treatment Charges: Mins Units   []  Modalities:      []

## 2024-06-03 NOTE — DISCHARGE SUMMARY
[] [facility]  Outpatient Rehabilitation &  Therapy  [address]  [phone]  F: [phone] [] [facility]  Outpatient Rehabilitation &  Therapy  [address]   [address]  P: [phone]  F: [phone] [x] [facility]  Outpatient Rehabilitation &  Therapy  [address]  P: [phone]  F: [phone] [] [facility]  Outpatient Rehabilitation &  Therapy  [address]   P: [phone]  F: [phone] [] [facility]   Outpatient Rehabilitation   & Therapy  [address]  P: [phone]   F: [phone]     Occupational Therapy Discharge Note    Date: 6/3/2024      Patient: Rebecca Banuelos  : 1960  MRN: [id number]    Referring Provider:   Kelsea Sands PA-C  Insurance: Self pay  Medical Diagnosis: M65.4 (ICD-10-CM) - De Quervain's disease (radial styloid tenosynovitis), M25.531 (ICD-10-CM) - Right wrist pain        Rehab Codes: pain in wrist M25.53, or pain in right finger(s) M79.644,  Onset Date: 2023               Next Dr. Appt: 24  Visit# / total visits: 3/6; Progress note for Medicare patient due at visit 3  Cancels/No Shows: 0/0  Total visits attended: 3  Date of initial visit: 24                Date of final visit: 6/3/24      Subjective:  Pain:  [] Yes  [x] No  Location:  N/A Pain Rating: (0-10 scale) 0/10  Pain altered Tx:  [x] No  [] Yes  Action:  Comments: Pt report she is doing really well.  Pt states she was able to play tennis today w/out any issues and didn't wear her brace.     Objective:  Tests/Measurements: Upper Extremity Functional Index  Current Functional Level:  76/80 functionally impaired as measured with the Upper Extremity Functional Index Survey.  0-80 scale, with 80 = no Deficits  (The UEFI model does not provide any specific cut off points that could classify the upper limb disability degree, however, a minimal detectable change of 9

## 2024-06-14 RX ORDER — FLUTICASONE PROPIONATE 50 MCG
SPRAY, SUSPENSION (ML) NASAL
Qty: 48 G | Refills: 5 | Status: SHIPPED | OUTPATIENT
Start: 2024-06-14

## 2024-06-14 NOTE — TELEPHONE ENCOUNTER
Rebecca Banuelos is calling to request a refill on the following medication(s):    Medication Request:  Requested Prescriptions     Pending Prescriptions Disp Refills    fluticasone (FLONASE) 50 MCG/ACT nasal spray 48 g 5     Sig: USE 2 SPRAYS NASALLY DAILY       Last Visit Date (If Applicable):  5/1/2024    Next Visit Date:    6/19/2024

## 2024-06-26 DIAGNOSIS — M25.551 RIGHT HIP PAIN: Primary | ICD-10-CM

## 2024-07-01 ENCOUNTER — OFFICE VISIT (OUTPATIENT)
Dept: ORTHOPEDIC SURGERY | Age: 64
End: 2024-07-01
Payer: COMMERCIAL

## 2024-07-01 VITALS — HEIGHT: 62 IN | RESPIRATION RATE: 16 BRPM | BODY MASS INDEX: 27.94 KG/M2 | OXYGEN SATURATION: 98 % | WEIGHT: 151.8 LBS

## 2024-07-01 DIAGNOSIS — M70.61 GREATER TROCHANTERIC BURSITIS OF RIGHT HIP: Primary | ICD-10-CM

## 2024-07-01 DIAGNOSIS — M16.11 PRIMARY OSTEOARTHRITIS OF RIGHT HIP: ICD-10-CM

## 2024-07-01 PROCEDURE — 99204 OFFICE O/P NEW MOD 45 MIN: CPT | Performed by: PHYSICIAN ASSISTANT

## 2024-07-01 PROCEDURE — 20611 DRAIN/INJ JOINT/BURSA W/US: CPT | Performed by: PHYSICIAN ASSISTANT

## 2024-07-01 RX ORDER — LIDOCAINE HYDROCHLORIDE 10 MG/ML
2 INJECTION, SOLUTION INFILTRATION; PERINEURAL ONCE
Status: COMPLETED | OUTPATIENT
Start: 2024-07-01 | End: 2024-07-01

## 2024-07-01 RX ORDER — ESTRADIOL 0.1 MG/G
CREAM VAGINAL
COMMUNITY
Start: 2024-04-05

## 2024-07-01 RX ORDER — METHYLPREDNISOLONE ACETATE 80 MG/ML
80 INJECTION, SUSPENSION INTRA-ARTICULAR; INTRALESIONAL; INTRAMUSCULAR; SOFT TISSUE ONCE
Status: COMPLETED | OUTPATIENT
Start: 2024-07-01 | End: 2024-07-01

## 2024-07-01 RX ADMIN — LIDOCAINE HYDROCHLORIDE 2 ML: 10 INJECTION, SOLUTION INFILTRATION; PERINEURAL at 16:22

## 2024-07-01 RX ADMIN — METHYLPREDNISOLONE ACETATE 80 MG: 80 INJECTION, SUSPENSION INTRA-ARTICULAR; INTRALESIONAL; INTRAMUSCULAR; SOFT TISSUE at 16:23

## 2024-07-01 ASSESSMENT — ENCOUNTER SYMPTOMS
ABDOMINAL PAIN: 0
VOMITING: 0
SHORTNESS OF BREATH: 0
APNEA: 0
DIARRHEA: 0
CONSTIPATION: 0
NAUSEA: 0
CHEST TIGHTNESS: 0
COLOR CHANGE: 0
ABDOMINAL DISTENTION: 0
RESPIRATORY NEGATIVE: 1
COUGH: 0
GASTROINTESTINAL NEGATIVE: 1

## 2024-07-01 NOTE — PROGRESS NOTES
[facility] ORTHOPEDICS AND SPORTS MEDICINE  [address]  [place] [address]  Dept: [phone]  Dept Fax: [phone]        Right hip Office Visit    Subjective:     Chief Complaint   Patient presents with    Hip Pain     Est.p/NP-Right Hip pain LI 11/19/21     HPI:     Rebecca Banuelos presents with a several year history of pain in the right hip. She was doing well until about 3 weeks ago. The pain does radiate into the thigh and into the groin. The pain is present over the lateral aspect of the hip. THe pain is worse when going from sitting to standing but gets better as she gets going.  Night pain, which disturbs the patient`s sleep is not a problem. She has pain while playing tennis. She plays tennis and pickle ball 4-5 times a week. She will also do yoga and walk her dogs. she has tried ice, biofreeze, voltaren gel and reduction of activity and reports little improvement. She started doing her exercises from PT again. The patient has not had surgery. The opposite hip is  okay. Knee pain is not noted.  Patient was previously seen by Dr. Kavin Salas.  The patient has tried a greater trochanteric bursa injection with improvement.  Patient does have a history of lupus and mitral valve prolapse.  Her last greater trochanteric bursa injection was 11/29/2021 by Dr. Kavin Salas. She has been seeing a chiropractor.     ROS:     Review of Systems   Constitutional:  Positive for activity change. Negative for appetite change, fatigue and fever.   Respiratory: Negative.  Negative for apnea, cough, chest tightness and shortness of breath.    Cardiovascular: Negative.  Negative for chest pain, palpitations and leg swelling.   Gastrointestinal: Negative.  Negative for abdominal distention, abdominal pain, constipation, diarrhea, nausea and vomiting.   Genitourinary: Negative.  Negative for difficulty urinating, dysuria and hematuria.

## 2024-07-02 ENCOUNTER — OFFICE VISIT (OUTPATIENT)
Dept: FAMILY MEDICINE CLINIC | Age: 64
End: 2024-07-02
Payer: COMMERCIAL

## 2024-07-02 VITALS
WEIGHT: 153.4 LBS | TEMPERATURE: 97.3 F | HEIGHT: 62 IN | HEART RATE: 79 BPM | DIASTOLIC BLOOD PRESSURE: 64 MMHG | SYSTOLIC BLOOD PRESSURE: 100 MMHG | RESPIRATION RATE: 16 BRPM | BODY MASS INDEX: 28.23 KG/M2 | OXYGEN SATURATION: 100 %

## 2024-07-02 DIAGNOSIS — J40 BRONCHITIS: ICD-10-CM

## 2024-07-02 DIAGNOSIS — E78.00 HIGH CHOLESTEROL: Primary | ICD-10-CM

## 2024-07-02 DIAGNOSIS — M25.551 RIGHT HIP PAIN: ICD-10-CM

## 2024-07-02 PROCEDURE — 99214 OFFICE O/P EST MOD 30 MIN: CPT | Performed by: INTERNAL MEDICINE

## 2024-07-02 RX ORDER — AZITHROMYCIN 500 MG/1
500 TABLET, FILM COATED ORAL DAILY
Qty: 5 TABLET | Refills: 0 | Status: SHIPPED | OUTPATIENT
Start: 2024-07-02 | End: 2024-07-07

## 2024-07-02 RX ORDER — PREDNISONE 10 MG/1
10 TABLET ORAL
Qty: 15 TABLET | Refills: 0 | Status: SHIPPED | OUTPATIENT
Start: 2024-07-02 | End: 2024-07-07

## 2024-07-02 NOTE — PROGRESS NOTES
MD Herminio on 7/2/2024 at 4:07 PM    This note is created with a voice recognition program and while intend to generate a document that accurately reflects the content of the visit, no guarantee can be provided that every mistake has been identified and corrected by editing.

## 2024-07-05 ENCOUNTER — HOSPITAL ENCOUNTER (OUTPATIENT)
Age: 64
Setting detail: SPECIMEN
Discharge: HOME | End: 2024-07-05

## 2024-07-05 DIAGNOSIS — J40 BRONCHITIS: ICD-10-CM

## 2024-07-05 DIAGNOSIS — E78.00 HIGH CHOLESTEROL: ICD-10-CM

## 2024-07-05 LAB
25(OH)D3 SERPL-MCNC: 40 NG/ML (ref 30–100)
ALBUMIN SERPL-MCNC: 4.2 G/DL (ref 3.5–5.2)
ALBUMIN/GLOB SERPL: 1 {RATIO} (ref 1–2.5)
ALP SERPL-CCNC: 96 U/L (ref 35–104)
ALT SERPL-CCNC: 50 U/L (ref 10–35)
ANION GAP SERPL CALCULATED.3IONS-SCNC: 11 MMOL/L (ref 9–16)
AST SERPL-CCNC: 21 U/L (ref 10–35)
BILIRUB SERPL-MCNC: 0.4 MG/DL (ref 0–1.2)
BUN SERPL-MCNC: 15 MG/DL (ref 8–23)
CALCIUM SERPL-MCNC: 8.6 MG/DL (ref 8.6–10.4)
CHLORIDE SERPL-SCNC: 103 MMOL/L (ref 98–107)
CHOLEST SERPL-MCNC: 232 MG/DL (ref 0–199)
CHOLESTEROL/HDL RATIO: 3
CO2 SERPL-SCNC: 29 MMOL/L (ref 20–31)
CREAT SERPL-MCNC: 0.9 MG/DL (ref 0.5–0.9)
ERYTHROCYTE [DISTWIDTH] IN BLOOD BY AUTOMATED COUNT: 12 % (ref 11.8–14.4)
GFR, ESTIMATED: 73 ML/MIN/1.73M2
GLUCOSE SERPL-MCNC: 89 MG/DL (ref 74–99)
HCT VFR BLD AUTO: 41.6 % (ref 36.3–47.1)
HDLC SERPL-MCNC: 93 MG/DL
HGB BLD-MCNC: 13.7 G/DL (ref 11.9–15.1)
LDLC SERPL CALC-MCNC: 126 MG/DL (ref 0–100)
MAGNESIUM SERPL-MCNC: 2.1 MG/DL (ref 1.6–2.4)
MCH RBC QN AUTO: 30.4 PG (ref 25.2–33.5)
MCHC RBC AUTO-ENTMCNC: 32.9 G/DL (ref 28.4–34.8)
MCV RBC AUTO: 92.4 FL (ref 82.6–102.9)
NRBC BLD-RTO: 0 PER 100 WBC
PLATELET # BLD AUTO: 299 K/UL (ref 138–453)
PMV BLD AUTO: 10.7 FL (ref 8.1–13.5)
POTASSIUM SERPL-SCNC: 3.9 MMOL/L (ref 3.7–5.3)
PROT SERPL-MCNC: 7.1 G/DL (ref 6.6–8.7)
RBC # BLD AUTO: 4.5 M/UL (ref 3.95–5.11)
SODIUM SERPL-SCNC: 143 MMOL/L (ref 136–145)
TRIGL SERPL-MCNC: 67 MG/DL
VIT B12 SERPL-MCNC: 588 PG/ML (ref 232–1245)
VLDLC SERPL CALC-MCNC: 13 MG/DL
WBC OTHER # BLD: 7 K/UL (ref 3.5–11.3)

## 2024-07-09 ENCOUNTER — TELEPHONE (OUTPATIENT)
Dept: FAMILY MEDICINE CLINIC | Age: 64
End: 2024-07-09

## 2024-07-09 DIAGNOSIS — J40 BRONCHITIS: ICD-10-CM

## 2024-07-09 DIAGNOSIS — J01.11 ACUTE RECURRENT FRONTAL SINUSITIS: Primary | ICD-10-CM

## 2024-07-09 RX ORDER — LEVOFLOXACIN 500 MG/1
500 TABLET, FILM COATED ORAL DAILY
Qty: 10 TABLET | Refills: 0 | Status: SHIPPED | OUTPATIENT
Start: 2024-07-09 | End: 2024-07-19

## 2024-07-09 NOTE — TELEPHONE ENCOUNTER
Patient states she has been using her Flonase and Claritin, she feels like its more sinus than in her chest she has head congestion and post nasal drip

## 2024-07-09 NOTE — TELEPHONE ENCOUNTER
Patient calling states she still have bronchitis symptoms she has completed the z-kvng and the prednisone but still not feeling well she states in the past she was given Keflex and it worked well for her. Please advise

## 2024-07-17 ENCOUNTER — TELEPHONE (OUTPATIENT)
Dept: FAMILY MEDICINE CLINIC | Age: 64
End: 2024-07-17

## 2024-07-17 DIAGNOSIS — J40 BRONCHITIS: ICD-10-CM

## 2024-07-17 DIAGNOSIS — J01.11 ACUTE RECURRENT FRONTAL SINUSITIS: ICD-10-CM

## 2024-07-17 NOTE — TELEPHONE ENCOUNTER
Patient states she is feeling a little better but not completely still have a lot of drainage and post nasal drip

## 2024-08-01 ENCOUNTER — TELEPHONE (OUTPATIENT)
Age: 64
End: 2024-08-01

## 2024-08-01 DIAGNOSIS — Z12.31 SCREENING MAMMOGRAM FOR BREAST CANCER: Primary | ICD-10-CM

## 2024-08-01 NOTE — TELEPHONE ENCOUNTER
Pt seen by Jackie Griffin CNP for annual on 4/5/24, Mammogram order given at visit. [facility] scheduling called and needed order. New order generated in [employer]

## 2024-08-05 RX ORDER — AZELASTINE 1 MG/ML
1 SPRAY, METERED NASAL 2 TIMES DAILY
Qty: 2 EACH | Refills: 0 | Status: SHIPPED | OUTPATIENT
Start: 2024-08-05

## 2024-08-05 NOTE — TELEPHONE ENCOUNTER
Patient is asking if she can have a refill on an old nose spray that you use to give her? Pending    Also stating that she is waiting on a prior auth to be done for her flonase?    States she had lab work done done on 7/5/24, her insurance is not paying for it do to the DX used, is asking for you to correct this?    Please advise

## 2024-08-15 ENCOUNTER — TELEPHONE (OUTPATIENT)
Dept: FAMILY MEDICINE CLINIC | Age: 64
End: 2024-08-15

## 2024-08-15 NOTE — TELEPHONE ENCOUNTER
Patient is calling in stating she spoke with someone a couple weeks ago regarding her Flonase. She is wanting to know the status if her insurance is going to be covering it.

## 2024-09-03 DIAGNOSIS — Z12.31 SCREENING MAMMOGRAM FOR BREAST CANCER: ICD-10-CM

## 2024-09-12 ENCOUNTER — OFFICE VISIT (OUTPATIENT)
Dept: ORTHOPEDIC SURGERY | Age: 64
End: 2024-09-12
Payer: COMMERCIAL

## 2024-09-12 VITALS — BODY MASS INDEX: 28.16 KG/M2 | WEIGHT: 153 LBS | HEIGHT: 62 IN | RESPIRATION RATE: 15 BRPM | OXYGEN SATURATION: 98 %

## 2024-09-12 DIAGNOSIS — M16.11 PRIMARY OSTEOARTHRITIS OF RIGHT HIP: ICD-10-CM

## 2024-09-12 DIAGNOSIS — M70.61 GREATER TROCHANTERIC BURSITIS OF RIGHT HIP: Primary | ICD-10-CM

## 2024-09-12 PROCEDURE — 99213 OFFICE O/P EST LOW 20 MIN: CPT | Performed by: PHYSICIAN ASSISTANT

## 2024-09-12 RX ORDER — CELECOXIB 200 MG/1
200 CAPSULE ORAL DAILY
Qty: 60 CAPSULE | Refills: 3 | Status: SHIPPED | OUTPATIENT
Start: 2024-09-12

## 2024-09-12 ASSESSMENT — ENCOUNTER SYMPTOMS
NAUSEA: 0
DIARRHEA: 0
RESPIRATORY NEGATIVE: 1
COUGH: 0
CHEST TIGHTNESS: 0
COLOR CHANGE: 0
GASTROINTESTINAL NEGATIVE: 1
APNEA: 0
ABDOMINAL DISTENTION: 0
ABDOMINAL PAIN: 0
CONSTIPATION: 0
SHORTNESS OF BREATH: 0
VOMITING: 0

## 2024-10-10 RX ORDER — FLUTICASONE PROPIONATE 50 MCG
2 SPRAY, SUSPENSION (ML) NASAL DAILY
Qty: 3 EACH | Refills: 0 | Status: SHIPPED | OUTPATIENT
Start: 2024-10-10

## 2024-10-10 NOTE — TELEPHONE ENCOUNTER
Patient would like this filled asap as she has been waiting since  for the mediation.       Rebecca Banuelos is calling to request a refill on the following medication(s):    Medication Request:  Requested Prescriptions     Pending Prescriptions Disp Refills    fluticasone (FLONASE) 50 MCG/ACT nasal spray 3 each 0     Si sprays by Each Nostril route daily       Last Visit Date (If Applicable):  2024    Next Visit Date:    2025      Last refill 8/15/24. Prescriptions pending.

## 2024-11-14 ENCOUNTER — OFFICE VISIT (OUTPATIENT)
Dept: ORTHOPEDIC SURGERY | Age: 64
End: 2024-11-14

## 2024-11-14 VITALS — BODY MASS INDEX: 28.16 KG/M2 | HEIGHT: 62 IN | RESPIRATION RATE: 16 BRPM | WEIGHT: 153 LBS | OXYGEN SATURATION: 98 %

## 2024-11-14 DIAGNOSIS — M70.61 GREATER TROCHANTERIC BURSITIS OF RIGHT HIP: ICD-10-CM

## 2024-11-14 DIAGNOSIS — M16.11 PRIMARY OSTEOARTHRITIS OF RIGHT HIP: Primary | ICD-10-CM

## 2024-11-14 RX ORDER — METHYLPREDNISOLONE ACETATE 80 MG/ML
80 INJECTION, SUSPENSION INTRA-ARTICULAR; INTRALESIONAL; INTRAMUSCULAR; SOFT TISSUE ONCE
Status: COMPLETED | OUTPATIENT
Start: 2024-11-14 | End: 2024-11-14

## 2024-11-14 RX ORDER — LIDOCAINE HYDROCHLORIDE 10 MG/ML
2 INJECTION, SOLUTION INFILTRATION; PERINEURAL ONCE
Status: COMPLETED | OUTPATIENT
Start: 2024-11-14 | End: 2024-11-14

## 2024-11-14 RX ADMIN — METHYLPREDNISOLONE ACETATE 80 MG: 80 INJECTION, SUSPENSION INTRA-ARTICULAR; INTRALESIONAL; INTRAMUSCULAR; SOFT TISSUE at 13:12

## 2024-11-14 RX ADMIN — LIDOCAINE HYDROCHLORIDE 2 ML: 10 INJECTION, SOLUTION INFILTRATION; PERINEURAL at 13:11

## 2024-11-14 ASSESSMENT — ENCOUNTER SYMPTOMS
DIARRHEA: 0
NAUSEA: 0
SHORTNESS OF BREATH: 0
VOMITING: 0
COLOR CHANGE: 0
APNEA: 0
CONSTIPATION: 0
CHEST TIGHTNESS: 0
COUGH: 0
ABDOMINAL DISTENTION: 0
GASTROINTESTINAL NEGATIVE: 1
ABDOMINAL PAIN: 0
RESPIRATORY NEGATIVE: 1

## 2024-11-14 NOTE — PROGRESS NOTES
soreness. The patient should not submerge the injection site in water for a minimum of 24 hours to avoid infection. This means no lakes, pools, ponds, or hot tubs for 24 hours. If the patient is diabetic the injection may increase their blood sugar for up to one week. The patient can do this cortisone injection once every 4 months as needed. If the injections stop working and do not give the patient relief the patient should consider surgical interventions to produce long term relief.  If she is still having collateral pain she will follow-up and we will do a greater troches injection in 4 weeks.  If she is doing well she will call and cancel.  She will continue doing her exercises.  Call if she has any questions or concerns.     Follow up:Return in about 4 weeks (around 12/12/2024).          Orders Placed This Encounter   Medications    lidocaine 1 % injection 2 mL    methylPREDNISolone acetate (DEPO-MEDROL) injection 80 mg         No orders of the defined types were placed in this encounter.      This note is created with the assistance of a speech recognition program.  While intending to generate a document that actually reflects the content of the visit, the document can still have some errors including those of syntax and sound a like substitutions which may escape proof reading.  In such instances, actual meaning can be extrapolated by contextual diversion.     Electronically signed by Nereida Hendrix PA-C on 11/14/2024 at 1:32 PM

## 2024-11-14 NOTE — PATIENT INSTRUCTIONS
PATIENTIQ:  PatientIQ helps [facility] stay in touch with you to know how you're feeling, and provides education and care instructions to you at various time points.   Your answers help your care team track your progress to provide the best care possible. PatientIQ will contact you pre-op and post-op via email or text with:  Educational Videos and Care Instructions  Questionnaires About How You're Feeling    Your participation provides you valuable education and helps [facility] continue to provide quality care to all patients. Thank you    CORTISONE INJECTION CARE    The injection site should never get red, hot, or swollen and if it does the patient will contact our office right away. The patient may experience a increase in soreness the first 24-48 hours due to a cortisone flair and can take anti-inflammatories for a short period of time to reduce that soreness. The patient should not submerge the injection site in water for a minimum of 24 hours to avoid infection. This means no lakes, pools, ponds, or hot tubs for 24 hours. If the patient is diabetic the injection may increase their blood sugar for up to one week. The patient can do this cortisone injection once every 4 months as needed.

## 2024-12-06 ENCOUNTER — TELEPHONE (OUTPATIENT)
Dept: FAMILY MEDICINE CLINIC | Age: 64
End: 2024-12-06

## 2024-12-06 NOTE — TELEPHONE ENCOUNTER
----- Message from Tasha JUARES sent at 12/5/2024  1:22 PM EST -----  Regarding: ECC Message to Provider  ECC Message to Provider    Relationship to Patient: Self     Additional Information: Patient wants to ask her provider, Livan Lama MD if when will be her next Colonoscopy Test.   --------------------------------------------------------------------------------------------------------------------------    Call Back Information: OK to leave message on voicemail  Preferred Call Back Number: Phone [phone]

## 2024-12-12 ENCOUNTER — OFFICE VISIT (OUTPATIENT)
Dept: ORTHOPEDIC SURGERY | Age: 64
End: 2024-12-12

## 2024-12-12 VITALS — BODY MASS INDEX: 28.34 KG/M2 | HEIGHT: 62 IN | RESPIRATION RATE: 15 BRPM | OXYGEN SATURATION: 98 % | WEIGHT: 154 LBS

## 2024-12-12 DIAGNOSIS — M70.61 GREATER TROCHANTERIC BURSITIS OF RIGHT HIP: Primary | ICD-10-CM

## 2024-12-12 DIAGNOSIS — M16.11 PRIMARY OSTEOARTHRITIS OF RIGHT HIP: ICD-10-CM

## 2024-12-12 RX ORDER — LIDOCAINE HYDROCHLORIDE 10 MG/ML
2 INJECTION, SOLUTION INFILTRATION; PERINEURAL ONCE
Status: COMPLETED | OUTPATIENT
Start: 2024-12-12 | End: 2024-12-12

## 2024-12-12 RX ORDER — METHYLPREDNISOLONE ACETATE 80 MG/ML
80 INJECTION, SUSPENSION INTRA-ARTICULAR; INTRALESIONAL; INTRAMUSCULAR; SOFT TISSUE ONCE
Status: COMPLETED | OUTPATIENT
Start: 2024-12-12 | End: 2024-12-12

## 2024-12-12 RX ADMIN — LIDOCAINE HYDROCHLORIDE 2 ML: 10 INJECTION, SOLUTION INFILTRATION; PERINEURAL at 15:44

## 2024-12-12 RX ADMIN — METHYLPREDNISOLONE ACETATE 80 MG: 80 INJECTION, SUSPENSION INTRA-ARTICULAR; INTRALESIONAL; INTRAMUSCULAR; SOFT TISSUE at 15:44

## 2024-12-12 ASSESSMENT — ENCOUNTER SYMPTOMS
CHEST TIGHTNESS: 0
CONSTIPATION: 0
APNEA: 0
SHORTNESS OF BREATH: 0
NAUSEA: 0
VOMITING: 0
ABDOMINAL DISTENTION: 0
COUGH: 0
GASTROINTESTINAL NEGATIVE: 1
ABDOMINAL PAIN: 0
COLOR CHANGE: 0
DIARRHEA: 0
RESPIRATORY NEGATIVE: 1

## 2024-12-12 NOTE — PROGRESS NOTES
[facility] ORTHOPEDICS AND SPORTS MEDICINE  [address]  [place] [address]  Dept: [phone]  Dept Fax: [phone]        Ambulatory Follow Up      Subjective:   Rebecca Banuelos is a 64 y.o. year old female who presents to our office today for routine followup regarding her   1. Greater trochanteric bursitis of right hip    2. Primary osteoarthritis of right hip    .    Chief Complaint   Patient presents with    Hip Pain     Right hip pain-injection requested       HPI Rebecca Banuelos  is a 64 y.o.  female who presents today in follow for right hip pain.  The patient was last seen on 11/14/2024 and underwent treatment in the form of a right intraarticular hip injection. The patient notes 85% improvement with the previous treatment.   She states she is able to play tennis, get in and out of her car, and go up and down stairs easier than before. She states she notices if she moves too quick or has her knees above her hip when sitting on stairs, she has some pain in the groin. The patient states she is still having some lateral right hip pain today and would like to try a right greater trochanteric injection.     Review of Systems   Constitutional:  Positive for activity change. Negative for appetite change, fatigue and fever.   Respiratory: Negative.  Negative for apnea, cough, chest tightness and shortness of breath.    Cardiovascular: Negative.  Negative for chest pain, palpitations and leg swelling.   Gastrointestinal: Negative.  Negative for abdominal distention, abdominal pain, constipation, diarrhea, nausea and vomiting.   Genitourinary: Negative.  Negative for difficulty urinating, dysuria and hematuria.   Musculoskeletal:  Positive for arthralgias. Negative for gait problem, joint swelling and myalgias.   Skin: Negative.  Negative for color change and rash.   Neurological: Negative.  Negative for dizziness, weakness,

## 2024-12-12 NOTE — PATIENT INSTRUCTIONS
PATIENTIQ:  PatientIQ helps [facility] stay in touch with you to know how you're feeling, and provides education and care instructions to you at various time points.   Your answers help your care team track your progress to provide the best care possible. PatientIQ will contact you pre-op and post-op via email or text with:  Educational Videos and Care Instructions  Questionnaires About How You're Feeling    Your participation provides you valuable education and helps [facility] continue to provide quality care to all patients. Thank you    CORTISONE INJECTION CARE    The injection site should never get red, hot, or swollen and if it does the patient will contact our office right away. The patient may experience a increase in soreness the first 24-48 hours due to a cortisone flair and can take anti-inflammatories for a short period of time to reduce that soreness. The patient should not submerge the injection site in water for a minimum of 24 hours to avoid infection. This means no lakes, pools, ponds, or hot tubs for 24 hours. If the patient is diabetic the injection may increase their blood sugar for up to one week. The patient can do this cortisone injection once every 4 months as needed.

## 2024-12-23 RX ORDER — FLUTICASONE PROPIONATE 50 MCG
SPRAY, SUSPENSION (ML) NASAL
Qty: 3 EACH | Refills: 0 | Status: SHIPPED | OUTPATIENT
Start: 2024-12-23

## 2024-12-23 NOTE — TELEPHONE ENCOUNTER
Rebecca Banuelos is calling to request a refill on the following medication(s):    Medication Request:  Requested Prescriptions     Pending Prescriptions Disp Refills    fluticasone (FLONASE) 50 MCG/ACT nasal spray [Pharmacy Med Name: FLUTICASONE PROP 50 MCG SPRAY]       Sig: SPRAY 2 SPRAYS IN EACH NOSTRIL ONCE DAILY       Last Visit Date (If Applicable):  7/2/2024    Next Visit Date:    Visit date not found

## 2025-01-21 DIAGNOSIS — Z78.0 MENOPAUSE: Primary | ICD-10-CM

## 2025-01-21 DIAGNOSIS — Z78.0 MENOPAUSE: ICD-10-CM

## 2025-01-21 PROCEDURE — 77080 DXA BONE DENSITY AXIAL: CPT

## 2025-03-10 RX ORDER — FLUTICASONE PROPIONATE 50 MCG
SPRAY, SUSPENSION (ML) NASAL
OUTPATIENT
Start: 2025-03-10

## 2025-03-14 NOTE — PATIENT INSTRUCTIONS
CORTISONE INJECTION CARE    The injection site should never get red, hot, or swollen and if it does the patient will contact our office right away. The patient may experience a increase in soreness the first 24-48 hours due to a cortisone flair and can take anti-inflammatories for a short period of time to reduce that soreness. The patient should not submerge the injection site in water for a minimum of 24 hours to avoid infection. This means no lakes, pools, ponds, or hot tubs for 24 hours. If the patient is diabetic the injection may increase their blood sugar for up to one week. The patient can do this cortisone injection once every 4 months as needed.                                                                                                                                                                                                                                                                                                                PATIENTIQ:  PatientIQ helps [facility] stay in touch with you to know how you're feeling, and provides education and care instructions to you at various time points.   Your answers help your care team track your progress to provide the best care possible. PatientIQ will contact you pre-op and post-op via email or text with:  Educational Videos and Care Instructions  Questionnaires About How You're Feeling    Your participation provides you valuable education and helps [facility] continue to provide quality care to all patients. Thank you

## 2025-03-17 ENCOUNTER — OFFICE VISIT (OUTPATIENT)
Dept: ORTHOPEDIC SURGERY | Age: 65
End: 2025-03-17
Payer: COMMERCIAL

## 2025-03-17 VITALS — WEIGHT: 149 LBS | RESPIRATION RATE: 17 BRPM | OXYGEN SATURATION: 98 % | HEIGHT: 62 IN | BODY MASS INDEX: 27.42 KG/M2

## 2025-03-17 DIAGNOSIS — M16.11 PRIMARY OSTEOARTHRITIS OF RIGHT HIP: Primary | ICD-10-CM

## 2025-03-17 DIAGNOSIS — M70.61 GREATER TROCHANTERIC BURSITIS OF RIGHT HIP: ICD-10-CM

## 2025-03-17 PROCEDURE — 20611 DRAIN/INJ JOINT/BURSA W/US: CPT | Performed by: PHYSICIAN ASSISTANT

## 2025-03-17 PROCEDURE — 99213 OFFICE O/P EST LOW 20 MIN: CPT | Performed by: PHYSICIAN ASSISTANT

## 2025-03-17 RX ORDER — METHYLPREDNISOLONE ACETATE 80 MG/ML
80 INJECTION, SUSPENSION INTRA-ARTICULAR; INTRALESIONAL; INTRAMUSCULAR; SOFT TISSUE ONCE
Status: COMPLETED | OUTPATIENT
Start: 2025-03-17 | End: 2025-03-17

## 2025-03-17 RX ORDER — LIDOCAINE HYDROCHLORIDE 10 MG/ML
2 INJECTION, SOLUTION INFILTRATION; PERINEURAL ONCE
Status: COMPLETED | OUTPATIENT
Start: 2025-03-17 | End: 2025-03-17

## 2025-03-17 RX ADMIN — METHYLPREDNISOLONE ACETATE 80 MG: 80 INJECTION, SUSPENSION INTRA-ARTICULAR; INTRALESIONAL; INTRAMUSCULAR; SOFT TISSUE at 15:23

## 2025-03-17 RX ADMIN — LIDOCAINE HYDROCHLORIDE 2 ML: 10 INJECTION, SOLUTION INFILTRATION; PERINEURAL at 15:20

## 2025-03-17 ASSESSMENT — ENCOUNTER SYMPTOMS
ABDOMINAL PAIN: 0
GASTROINTESTINAL NEGATIVE: 1
NAUSEA: 0
VOMITING: 0
DIARRHEA: 0
CHEST TIGHTNESS: 0
CONSTIPATION: 0
COLOR CHANGE: 0
SHORTNESS OF BREATH: 0
RESPIRATORY NEGATIVE: 1
COUGH: 0
ABDOMINAL DISTENTION: 0
APNEA: 0

## 2025-03-17 NOTE — PROGRESS NOTES
[facility] ORTHOPEDICS AND SPORTS MEDICINE  [address]  [place] [address]  Dept: [phone]  Dept Fax: [phone]        Ambulatory Follow Up      Subjective:   Rebecca Banuelos is a 64 y.o. year old female who presents to our office today for routine followup regarding her   1. Primary osteoarthritis of right hip    2. Greater trochanteric bursitis of right hip    .    Chief Complaint   Patient presents with    Hip Pain     Right hip pain- injection requested         History of Present Illness  Micaela is here for follow-up of her right hip pain.  Patient had a interarticular hip injection on 11/14/2024 followed by a greater trochanteric bursa injection on 12/12/2024.      The patient presents for evaluation of right hip pain.    She reports that the previous greater trochanter injection administered in 12/2024 provided significant relief. However, she has been experiencing persistent right hip pain, which has been exacerbated by her inability to take anti-inflammatories due to her ongoing shockwave therapy for left plantar fasciitis. She has been off Celebrex for 2 weeks and plans to resume it after her final shockwave treatment next week. She was informed that the benefits of the treatment should continue to improve over the next 60 to 90 days. Despite these treatments, she continues to experience severe pain, particularly when attempting to stand from a seated position. She is considering hip replacement surgery and is seeking information about the recovery process.     She has been experiencing persistent left plantar fasciitis pain. She sought relief through a cortisone injection at her podiatrist's office but was unable to receive it due to the clinic's high patient volume. Instead, she opted for radio shockwave therapy, undergoing 3 sessions so far, with the most recent one on Friday. The therapy involves targeting the

## 2025-05-20 RX ORDER — ESTRADIOL 0.1 MG/G
CREAM VAGINAL
Qty: 42.5 G | Refills: 0 | Status: SHIPPED | OUTPATIENT
Start: 2025-05-20

## 2025-06-02 ENCOUNTER — HOSPITAL ENCOUNTER (OUTPATIENT)
Age: 65
Setting detail: SPECIMEN
Discharge: HOME | End: 2025-06-02

## 2025-06-02 ENCOUNTER — OFFICE VISIT (OUTPATIENT)
Age: 65
End: 2025-06-02
Payer: COMMERCIAL

## 2025-06-02 VITALS
SYSTOLIC BLOOD PRESSURE: 113 MMHG | BODY MASS INDEX: 27.83 KG/M2 | HEART RATE: 74 BPM | WEIGHT: 152.2 LBS | DIASTOLIC BLOOD PRESSURE: 73 MMHG

## 2025-06-02 DIAGNOSIS — Z78.0 MENOPAUSE: ICD-10-CM

## 2025-06-02 DIAGNOSIS — M85.80 OSTEOPENIA, UNSPECIFIED LOCATION: ICD-10-CM

## 2025-06-02 DIAGNOSIS — Z01.419 WELL WOMAN EXAM WITH ROUTINE GYNECOLOGICAL EXAM: Primary | ICD-10-CM

## 2025-06-02 DIAGNOSIS — Z12.31 ENCOUNTER FOR SCREENING MAMMOGRAM FOR MALIGNANT NEOPLASM OF BREAST: ICD-10-CM

## 2025-06-02 PROCEDURE — 99396 PREV VISIT EST AGE 40-64: CPT

## 2025-06-02 ASSESSMENT — ENCOUNTER SYMPTOMS: GASTROINTESTINAL NEGATIVE: 1

## 2025-06-02 NOTE — PROGRESS NOTES
[facility], [facility] UROGYNECOLOGY AND PELVIC REHABILITATION   [address] [place] [address]  Dept: [phone]   Patient:  Rebecca Banuelos   :  1960   Visit Date:  2025     VISIT - ANNUAL WELL EXAM  CC: This is an annual well patient visit.       HPI:    History of Present Illness  The patient presents for an annual exam.    She reports no pelvic discomfort, itching, burning sensations, or abnormal vaginal discharge. She is currently using vaginal estrogen cream, which she tolerates well and has recently refilled. She is sexually active without any post-coital bleeding, dyspareunia, or concerns about sexually transmitted infections. She does not use any other hormonal treatments apart from the vaginal estrogen. She has no breast-related issues and her last mammogram in 2024 was unremarkable. She has no history of abnormal Pap smears and expresses a desire to have one this year. She has no bowel-related issues and is due for a colonoscopy this year. She has no history of fibroids, masses, or ovarian cysts. She has not yet had her initial appointment with her primary care physician, which is scheduled for the summer. She has no abdominal pain or bloating but reports unexplained weight gain but has been stable over the last year; feels it is harder to lose weight since menopause. She maintains an active lifestyle, engaging in tennis twice weekly, pickleball, and daily dog walks. She is making efforts to maintain a balanced diet and supplements with vitamin D and calcium. Her sleep pattern is generally good, although she wakes up once or twice at night to urinate, which she attributes to her pre-bedtime tea consumption. She experiences mild urinary leakage but does not find it bothersome enough to seek treatment. She has annual dermatology check-ups.    She has been informed of the potential need for a right hip replacement due

## 2025-06-08 LAB — CYTOLOGY REPORT: NORMAL

## 2025-07-14 ENCOUNTER — TELEPHONE (OUTPATIENT)
Dept: ORTHOPEDIC SURGERY | Age: 65
End: 2025-07-14

## 2025-07-14 NOTE — TELEPHONE ENCOUNTER
----- Message from Junior WEINSTEIN sent at 7/14/2025  2:39 PM EDT -----  Regarding: Specialty Message to Provider  Specialty Message to Provider    Relationship to Patient: Self     Patient Message: Pt requesting refill of celecoxib (CELEBREX) 200 MG capsule     [facility] [id number] - [place], [place] - [address] - P [phone] - F [phone] [address] [facility] [address]  Phone: [phone]  Fax: [phone]      --------------------------------------------------------------------------------------------------------------------------    Call Back Information: OK to respond with electronic message via [employer] portal (only for patients who have registered [employer] account)    Preferred Call Back Number: [phone]

## 2025-07-14 NOTE — TELEPHONE ENCOUNTER
Spoke with patient and informed her this will be refilled and followed by her PCP for long term monitoring use.     She expressed understanding and had no further questions.